# Patient Record
Sex: FEMALE | Race: BLACK OR AFRICAN AMERICAN | Employment: FULL TIME | URBAN - METROPOLITAN AREA
[De-identification: names, ages, dates, MRNs, and addresses within clinical notes are randomized per-mention and may not be internally consistent; named-entity substitution may affect disease eponyms.]

---

## 2021-10-13 PROBLEM — N81.2 CERVIX PROLAPSED INTO VAGINA: Status: ACTIVE | Noted: 2021-10-13

## 2021-10-13 PROBLEM — Z01.419 ENCOUNTER FOR ANNUAL ROUTINE GYNECOLOGICAL EXAMINATION: Status: ACTIVE | Noted: 2021-10-13

## 2022-01-14 ENCOUNTER — HOSPITAL ENCOUNTER (OUTPATIENT)
Dept: GENERAL RADIOLOGY | Age: 32
Discharge: HOME | End: 2022-01-14
Attending: NURSE PRACTITIONER
Payer: COMMERCIAL

## 2022-01-14 PROBLEM — R03.0 ELEVATED BLOOD PRESSURE READING WITHOUT DIAGNOSIS OF HYPERTENSION: Status: ACTIVE | Noted: 2022-01-14

## 2022-01-14 PROBLEM — K21.9 GASTROESOPHAGEAL REFLUX DISEASE WITHOUT ESOPHAGITIS: Status: ACTIVE | Noted: 2022-01-14

## 2022-01-14 PROCEDURE — 73610 X-RAY EXAM OF ANKLE: CPT

## 2022-02-18 PROBLEM — R73.03 PREDIABETES: Status: ACTIVE | Noted: 2022-02-18

## 2022-02-18 PROBLEM — D57.3 SICKLE CELL TRAIT (HCC): Status: ACTIVE | Noted: 2022-02-18

## 2022-02-18 PROBLEM — E66.01 CLASS 3 SEVERE OBESITY DUE TO EXCESS CALORIES WITH SERIOUS COMORBIDITY AND BODY MASS INDEX (BMI) OF 50.0 TO 59.9 IN ADULT (HCC): Status: ACTIVE | Noted: 2022-02-18

## 2022-03-18 PROBLEM — K21.9 GASTROESOPHAGEAL REFLUX DISEASE WITHOUT ESOPHAGITIS: Status: ACTIVE | Noted: 2022-01-14

## 2022-03-18 PROBLEM — E66.01 CLASS 3 SEVERE OBESITY DUE TO EXCESS CALORIES WITH SERIOUS COMORBIDITY AND BODY MASS INDEX (BMI) OF 50.0 TO 59.9 IN ADULT (HCC): Status: ACTIVE | Noted: 2022-02-18

## 2022-03-18 PROBLEM — R03.0 ELEVATED BLOOD PRESSURE READING WITHOUT DIAGNOSIS OF HYPERTENSION: Status: ACTIVE | Noted: 2022-01-14

## 2022-03-18 PROBLEM — E66.813 CLASS 3 SEVERE OBESITY DUE TO EXCESS CALORIES WITH SERIOUS COMORBIDITY AND BODY MASS INDEX (BMI) OF 50.0 TO 59.9 IN ADULT: Status: ACTIVE | Noted: 2022-02-18

## 2022-03-19 PROBLEM — D57.3 SICKLE CELL TRAIT (HCC): Status: ACTIVE | Noted: 2022-02-18

## 2022-03-19 PROBLEM — Z01.419 ENCOUNTER FOR ANNUAL ROUTINE GYNECOLOGICAL EXAMINATION: Status: ACTIVE | Noted: 2021-10-13

## 2022-03-19 PROBLEM — R73.03 PREDIABETES: Status: ACTIVE | Noted: 2022-02-18

## 2022-03-20 PROBLEM — N81.2 CERVIX PROLAPSED INTO VAGINA: Status: ACTIVE | Noted: 2021-10-13

## 2022-08-03 ENCOUNTER — OFFICE VISIT (OUTPATIENT)
Dept: INTERNAL MEDICINE CLINIC | Facility: CLINIC | Age: 32
End: 2022-08-03
Payer: COMMERCIAL

## 2022-08-03 ENCOUNTER — HOSPITAL ENCOUNTER (OUTPATIENT)
Dept: GENERAL RADIOLOGY | Age: 32
Discharge: HOME | End: 2022-08-06
Payer: COMMERCIAL

## 2022-08-03 VITALS
HEART RATE: 97 BPM | TEMPERATURE: 98 F | DIASTOLIC BLOOD PRESSURE: 116 MMHG | SYSTOLIC BLOOD PRESSURE: 152 MMHG | BODY MASS INDEX: 50.02 KG/M2 | HEIGHT: 64 IN | RESPIRATION RATE: 16 BRPM | WEIGHT: 293 LBS | OXYGEN SATURATION: 97 %

## 2022-08-03 DIAGNOSIS — I10 PRIMARY HYPERTENSION: ICD-10-CM

## 2022-08-03 DIAGNOSIS — M54.41 ACUTE MIDLINE LOW BACK PAIN WITH RIGHT-SIDED SCIATICA: ICD-10-CM

## 2022-08-03 DIAGNOSIS — M54.41 ACUTE MIDLINE LOW BACK PAIN WITH RIGHT-SIDED SCIATICA: Primary | ICD-10-CM

## 2022-08-03 PROCEDURE — 99213 OFFICE O/P EST LOW 20 MIN: CPT | Performed by: INTERNAL MEDICINE

## 2022-08-03 PROCEDURE — 72110 X-RAY EXAM L-2 SPINE 4/>VWS: CPT

## 2022-08-03 RX ORDER — AMLODIPINE BESYLATE 5 MG/1
5 TABLET ORAL DAILY
Qty: 30 TABLET | Refills: 5 | Status: SHIPPED | OUTPATIENT
Start: 2022-08-03

## 2022-08-03 RX ORDER — IBUPROFEN 800 MG/1
800 TABLET ORAL 2 TIMES DAILY PRN
Qty: 30 TABLET | Refills: 1 | Status: SHIPPED | OUTPATIENT
Start: 2022-08-03

## 2022-08-03 ASSESSMENT — ENCOUNTER SYMPTOMS
SHORTNESS OF BREATH: 0
ABDOMINAL PAIN: 0
BACK PAIN: 1
SINUS PRESSURE: 0
RHINORRHEA: 0
CHEST TIGHTNESS: 0
COUGH: 0
VOMITING: 0
NAUSEA: 0
BLOOD IN STOOL: 0
DIARRHEA: 0

## 2022-08-03 NOTE — PROGRESS NOTES
[facility]      8/3/2022    Patient Name: Brianna Trevino  :  1990    Subjective:    Chief Complaint:  Chief Complaint   Patient presents with    Back Pain     Pt c/o sciatica, also pinching, numbness and burning in right leg. Foot Pain     Pt c/o right foot feeling like it's trying to buckle under. HPI c/o 2 week history of back pain radiating down her R leg, with \"pinching\" sensation, numbness; she denies history of falls, trauma; denies heavy lifting; she has numbness, tingling into her foot; she denies loss of bowel or bladder control; she is taking Ibuprofen otc, with minimal relief;     Past Medical History:   Diagnosis Date    GERD (gastroesophageal reflux disease)        Past Surgical History:   Procedure Laterality Date     SECTION  10/2020       Family History   Problem Relation Age of Onset    Arthritis Mother     Other Father         Vertigo    Uterine Cancer Neg Hx     Ovarian Cancer Neg Hx     Colon Cancer Neg Hx     Breast Cancer Neg Hx        Social History     Tobacco Use    Smoking status: Never    Smokeless tobacco: Former   Substance Use Topics    Alcohol use: Not Currently    Drug use: Never                 Current Outpatient Medications:     amLODIPine (NORVASC) 5 MG tablet, Take 1 tablet by mouth in the morning., Disp: 30 tablet, Rfl: 5    diclofenac (VOLTAREN) 50 MG EC tablet, Take 1 tablet by mouth in the morning and 1 tablet before bedtime. , Disp: 60 tablet, Rfl: 0    levonorgestrel-ethinyl estradiol (AVIANE;ALESSE;LESSINA) 0.1-20 MG-MCG per tablet, Take 1 tablet by mouth daily, Disp: , Rfl:     pantoprazole (PROTONIX) 40 MG tablet, Take 40 mg by mouth daily, Disp: , Rfl:     No Known Allergies    Review of Systems   Constitutional:  Negative for chills, fatigue and fever. HENT:  Negative for congestion, postnasal drip, rhinorrhea and sinus pressure. Eyes:  Negative for visual disturbance.    Respiratory:  Negative for cough, chest tightness and shortness of breath. Cardiovascular:  Negative for chest pain and palpitations. Gastrointestinal:  Negative for abdominal pain, blood in stool, diarrhea, nausea and vomiting. Genitourinary:  Negative for dysuria, frequency and urgency. Musculoskeletal:  Positive for back pain and myalgias. Negative for arthralgias. Skin: Negative. Neurological:  Positive for numbness. Negative for headaches. Psychiatric/Behavioral:  Negative for dysphoric mood and sleep disturbance. The patient is not nervous/anxious. Objective:  BP (!) 152/116   Pulse 97   Temp 98 °F (36.7 °C) (Temporal)   Resp 16   Ht 5' 3.5\" (1.613 m)   Wt 293 lb 6.4 oz (133.1 kg)   SpO2 97%   BMI 51.16 kg/m²     Examination:  Physical Exam  Constitutional:       Appearance: Normal appearance. She is obese. HENT:      Head: Normocephalic and atraumatic. Right Ear: Tympanic membrane and ear canal normal.      Left Ear: Tympanic membrane and ear canal normal.      Nose: Nose normal.      Mouth/Throat:      Mouth: Mucous membranes are moist.   Eyes:      Extraocular Movements: Extraocular movements intact. Conjunctiva/sclera: Conjunctivae normal.      Pupils: Pupils are equal, round, and reactive to light. Cardiovascular:      Rate and Rhythm: Normal rate and regular rhythm. Pulses: Normal pulses. Heart sounds: Normal heart sounds. Pulmonary:      Effort: Pulmonary effort is normal.      Breath sounds: Normal breath sounds. Abdominal:      General: Abdomen is flat. Bowel sounds are normal.      Palpations: Abdomen is soft. Musculoskeletal:         General: Tenderness present. Cervical back: Normal range of motion and neck supple. Skin:     General: Skin is warm and dry. Neurological:      General: No focal deficit present. Mental Status: She is alert. Mental status is at baseline. Psychiatric:         Mood and Affect: Mood normal.         Thought Content:  Thought content normal. Assessment/Plan:    Penelope Roach was seen today for back pain and foot pain. Diagnoses and all orders for this visit:    Acute midline low back pain with right-sided sciatica  Instructed to take medications as prescribed, and to call if no improvement in symptoms. Recommended heating pad, stretching exercises; she is to call if worsening symptoms;     -     XR LUMBAR SPINE (MIN 4 VIEWS); Future  -     diclofenac (VOLTAREN) 50 MG EC tablet; Take 1 tablet by mouth in the morning and 1 tablet before bedtime. Primary hypertension  Recommended low sodium diet; Goal: take meds as prescribed, monitor blood pressure at home and call with readings. -     amLODIPine (NORVASC) 5 MG tablet; Take 1 tablet by mouth in the morning. Follow-up and Dispositions    Return in about 4 weeks (around 8/31/2022), or if symptoms worsen or fail to improve. Medication Reconciliation:  Current Medications Verified: Current medications/ immunizations were reviewed, including purpose, with patient. Family History, Social History, Current and Past Medical History was reviewed with patient and updated at today's office visit. Medication Reconciliation list was given to patient/ family. Patient was advised to discard old medication lists and provide all providers with current list at each visit and carry list with them in case of emergency.     Completed By:   Tomas Ramos MD    [facility]  [address] [place] [address], [address] [place] [address], [address]  [phone]  [phone] fax  4:15 PM

## 2022-08-30 ENCOUNTER — OFFICE VISIT (OUTPATIENT)
Dept: OBGYN CLINIC | Age: 32
End: 2022-08-30
Payer: COMMERCIAL

## 2022-08-30 VITALS
WEIGHT: 293 LBS | BODY MASS INDEX: 50.02 KG/M2 | SYSTOLIC BLOOD PRESSURE: 158 MMHG | HEIGHT: 64 IN | DIASTOLIC BLOOD PRESSURE: 98 MMHG

## 2022-08-30 DIAGNOSIS — N92.6 IRREGULAR MENSES: Primary | ICD-10-CM

## 2022-08-30 DIAGNOSIS — E66.01 CLASS 3 SEVERE OBESITY WITH BODY MASS INDEX (BMI) OF 50.0 TO 59.9 IN ADULT, UNSPECIFIED OBESITY TYPE, UNSPECIFIED WHETHER SERIOUS COMORBIDITY PRESENT (HCC): ICD-10-CM

## 2022-08-30 DIAGNOSIS — I10 PRIMARY HYPERTENSION: ICD-10-CM

## 2022-08-30 DIAGNOSIS — E28.2 PCOS (POLYCYSTIC OVARIAN SYNDROME): ICD-10-CM

## 2022-08-30 DIAGNOSIS — N91.2 AMENORRHEA: ICD-10-CM

## 2022-08-30 PROCEDURE — 99214 OFFICE O/P EST MOD 30 MIN: CPT | Performed by: NURSE PRACTITIONER

## 2022-08-30 RX ORDER — MEDROXYPROGESTERONE ACETATE 10 MG/1
10 TABLET ORAL DAILY
Qty: 14 TABLET | Refills: 0 | Status: SHIPPED | OUTPATIENT
Start: 2022-08-30 | End: 2022-10-04 | Stop reason: ALTCHOICE

## 2022-08-30 ASSESSMENT — PATIENT HEALTH QUESTIONNAIRE - PHQ9
SUM OF ALL RESPONSES TO PHQ QUESTIONS 1-9: 0
SUM OF ALL RESPONSES TO PHQ QUESTIONS 1-9: 0
2. FEELING DOWN, DEPRESSED OR HOPELESS: 0
1. LITTLE INTEREST OR PLEASURE IN DOING THINGS: 0
SUM OF ALL RESPONSES TO PHQ QUESTIONS 1-9: 0
SUM OF ALL RESPONSES TO PHQ9 QUESTIONS 1 & 2: 0
SUM OF ALL RESPONSES TO PHQ QUESTIONS 1-9: 0

## 2022-08-30 NOTE — PATIENT INSTRUCTIONS
Thank you for coming today  Return on Thursday for blood work  Then start Provera 10 mg daily for 14 days  Return in 3 weeks for your ultrasound

## 2022-08-30 NOTE — ASSESSMENT & PLAN NOTE
UPT negative  Previous dx of PCOS in her 25s (irrgeular menses and acanthosis). Recent hx of pre-diabetes  Will RTO for fasting TSH, PRL, PCOS, and POF labs  Then start Provera 10 mg x14 days  F/u after provera for US and to discuss treatment options. Recommend avoiding estrogen containing medications given uncontrolled HTN  Long discussion regarding compliance with HTN treatment and weight loss. Pt states she is motivated to start eating healthier.

## 2022-08-30 NOTE — PROGRESS NOTES
Patric Lee is a 34 y.o. Meridith Galeazzi who is here to discuss missed period. Pt previously on OCPs and stopped OCPs in 2022. Pt reports she had menses Q month until  and no menses since then. Previous dx of PCOS in her 25s (based of irregular menses and darkening of skin behind neck). Patient's last menstrual period was 2022 (approximate). Past Medical History:   Diagnosis Date    GERD (gastroesophageal reflux disease)     Hypertension        Past Surgical History:   Procedure Laterality Date     SECTION  10/2020       Family History   Problem Relation Age of Onset    Arthritis Mother     Other Father         Vertigo    Uterine Cancer Neg Hx     Ovarian Cancer Neg Hx     Colon Cancer Neg Hx     Breast Cancer Neg Hx        Social History     Socioeconomic History    Marital status: Single     Spouse name: Not on file    Number of children: Not on file    Years of education: Not on file    Highest education level: Not on file   Occupational History    Not on file   Tobacco Use    Smoking status: Never    Smokeless tobacco: Former   Substance and Sexual Activity    Alcohol use: Not Currently    Drug use: Never    Sexual activity: Not Currently     Birth control/protection: None   Other Topics Concern    Not on file   Social History Narrative    Abuse: Feels safe at home, no history of physical abuse, no history of sexual abuse       Social Determinants of Health     Financial Resource Strain: Not on file   Food Insecurity: Not on file   Transportation Needs: Not on file   Physical Activity: Not on file   Stress: Not on file   Social Connections: Not on file   Intimate Partner Violence: Not on file   Housing Stability: Not on file           Objective:    Vitals:    22 1536   BP: (!) 158/98   Site: Left Upper Arm   Position: Sitting   Weight: 293 lb (132.9 kg)   Height: 5' 3.5\" (1.613 m)         Constitutional:  well-developed, well-nourished, and in no distress. Mental: Alert and awake. Oriented to person/place/time. Able to follow commands    Eyes: EOM nl, Sclera nl, Ocular Discharge not visualized    HENT: Normocephalic and atraumatic. Mouth/Throat: Mucous membranes are moist    External Ears: nl    Neck: Normal range of motion. No masses visualized       Pulmonary: Effort normal. No visualized signs of difficulty breathing or respiratory distress    Musculoskeletal: Normal range of motion. Normal gait with no signs of ataxia     Neurological: No Facial Asymmetry (Cranial nerve 7 motor function) No gaze palsy    Skin: No significant exanthematous lesions or discoloration noted on facial skin      Psych: Normal affect. No hallucinations              Assessment/Plan            Patient Active Problem List    Diagnosis Date Noted    Amenorrhea 08/30/2022     Priority: Medium     Assessment & Plan Note:     UPT negative  Previous dx of PCOS in her 25s (irrgeular menses and acanthosis). Recent hx of pre-diabetes  Will RTO for fasting TSH, PRL, PCOS, and POF labs  Then start Provera 10 mg x14 days  F/u after provera for US and to discuss treatment options. Recommend avoiding estrogen containing medications given uncontrolled HTN      Primary hypertension 08/03/2022     Priority: Medium     Assessment & Plan Note:     Pt not taking amlodipine as prescribed - compliance encouraged.  We discuss at length risk of untreated HTN including stroke, kidney damage, and CAD      Class 3 severe obesity due to excess calories with serious comorbidity and body mass index (BMI) of 50.0 to 59.9 in adult ([facility] [address] 02/18/2022    Sickle cell trait ([facility] [address] 02/18/2022    Prediabetes 02/18/2022    Elevated blood pressure reading without diagnosis of hypertension 01/14/2022    Gastroesophageal reflux disease without esophagitis 01/14/2022    Encounter for annual routine gynecological examination 10/13/2021    Cervix prolapsed into vagina 10/13/2021       Problem List Items Addressed This Visit

## 2022-08-30 NOTE — PROGRESS NOTES
Patient comes in today for irregular cycle. Patient states she stopped her birth control in March. Patient states she wanted to get her hormones checked for PCOS. She stated she was dx with PCOS in her 19's. LAST PAP:  10/14/2021, Negative    LAST MAMMO:  Never    LMP:  Patient's last menstrual period was 06/13/2022 (approximate).     BIRTH CONTROL:  none    TOBACCO USE:  No    FAMILY HISTORY OF:   Breast Cancer:  No   Ovarian Cancer:  No   Uterine Cancer:  No   Colon Cancer:  No    Vitals:    08/30/22 1536   BP: (!) 158/98   Site: Left Upper Arm   Position: Sitting   Weight: 293 lb (132.9 kg)   Height: 5' 3.5\" (1.613 m)        Lisa Espinoza MA  08/30/22  3:44 PM

## 2022-09-01 DIAGNOSIS — N91.2 AMENORRHEA: ICD-10-CM

## 2022-09-01 DIAGNOSIS — E66.01 CLASS 3 SEVERE OBESITY WITH BODY MASS INDEX (BMI) OF 50.0 TO 59.9 IN ADULT, UNSPECIFIED OBESITY TYPE, UNSPECIFIED WHETHER SERIOUS COMORBIDITY PRESENT (HCC): ICD-10-CM

## 2022-09-01 DIAGNOSIS — E28.2 PCOS (POLYCYSTIC OVARIAN SYNDROME): ICD-10-CM

## 2022-09-01 DIAGNOSIS — N92.6 IRREGULAR MENSES: ICD-10-CM

## 2022-09-01 LAB
ALBUMIN SERPL-MCNC: 3.6 G/DL (ref 3.5–5)
ALBUMIN/GLOB SERPL: 0.9 {RATIO} (ref 1.2–3.5)
ALP SERPL-CCNC: 88 U/L (ref 50–136)
ALT SERPL-CCNC: 26 U/L (ref 12–65)
ANION GAP SERPL CALC-SCNC: 6 MMOL/L (ref 4–13)
AST SERPL-CCNC: 13 U/L (ref 15–37)
BILIRUB SERPL-MCNC: 0.3 MG/DL (ref 0.2–1.1)
BUN SERPL-MCNC: 9 MG/DL (ref 6–23)
CALCIUM SERPL-MCNC: 9.3 MG/DL (ref 8.3–10.4)
CHLORIDE SERPL-SCNC: 106 MMOL/L (ref 101–110)
CHOLEST SERPL-MCNC: 191 MG/DL
CO2 SERPL-SCNC: 26 MMOL/L (ref 21–32)
CREAT SERPL-MCNC: 0.8 MG/DL (ref 0.6–1)
ERYTHROCYTE [DISTWIDTH] IN BLOOD BY AUTOMATED COUNT: 15.8 % (ref 11.9–14.6)
GLOBULIN SER CALC-MCNC: 4 G/DL (ref 2.3–3.5)
GLUCOSE SERPL-MCNC: 84 MG/DL (ref 65–100)
HCT VFR BLD AUTO: 39.2 % (ref 35.8–46.3)
HDLC SERPL-MCNC: 46 MG/DL (ref 40–60)
HDLC SERPL: 4.2 {RATIO}
HGB BLD-MCNC: 11.9 G/DL (ref 11.7–15.4)
LDLC SERPL CALC-MCNC: 118.2 MG/DL
MCH RBC QN AUTO: 22.5 PG (ref 26.1–32.9)
MCHC RBC AUTO-ENTMCNC: 30.4 G/DL (ref 31.4–35)
MCV RBC AUTO: 74 FL (ref 79.6–97.8)
NRBC # BLD: 0 K/UL (ref 0–0.2)
PLATELET # BLD AUTO: 381 K/UL (ref 150–450)
PMV BLD AUTO: 11.3 FL (ref 9.4–12.3)
POTASSIUM SERPL-SCNC: 4.1 MMOL/L (ref 3.5–5.1)
PROGEST SERPL-MCNC: <0.21 NG/ML
PROT SERPL-MCNC: 7.6 G/DL (ref 6.3–8.2)
RBC # BLD AUTO: 5.3 M/UL (ref 4.05–5.2)
SODIUM SERPL-SCNC: 138 MMOL/L (ref 136–145)
TRIGL SERPL-MCNC: 134 MG/DL (ref 35–150)
TSH W FREE THYROID IF ABNORMAL: 1.48 UIU/ML (ref 0.36–3.74)
VLDLC SERPL CALC-MCNC: 26.8 MG/DL (ref 6–23)
WBC # BLD AUTO: 6 K/UL (ref 4.3–11.1)

## 2022-09-02 LAB
DHEA-S SERPL-MCNC: 49.1 UG/DL (ref 84.8–378)
EST. AVERAGE GLUCOSE BLD GHB EST-MCNC: 120 MG/DL
ESTRADIOL SERPL-MCNC: 113 PG/ML
FSH SERPL-ACNC: 7.4 MIU/ML
HBA1C MFR BLD: 5.8 % (ref 4.8–5.6)
INSULIN SERPL-ACNC: 40.4 UIU/ML (ref 2.6–24.9)
LH SERPL-ACNC: 7.8 MIU/ML
PROLACTIN SERPL-MCNC: 9.2 NG/ML

## 2022-09-04 LAB
TESTOST FREE SERPL-MCNC: 0.6 PG/ML (ref 0–4.2)
TESTOST SERPL-MCNC: <3 NG/DL (ref 8–60)

## 2022-09-11 LAB — MIS SERPL-MCNC: 0.41 NG/ML

## 2022-10-04 ENCOUNTER — OFFICE VISIT (OUTPATIENT)
Dept: OBGYN CLINIC | Age: 32
End: 2022-10-04
Payer: COMMERCIAL

## 2022-10-04 VITALS
BODY MASS INDEX: 49 KG/M2 | HEIGHT: 64 IN | DIASTOLIC BLOOD PRESSURE: 84 MMHG | SYSTOLIC BLOOD PRESSURE: 128 MMHG | WEIGHT: 287 LBS

## 2022-10-04 DIAGNOSIS — N91.2 AMENORRHEA: Primary | ICD-10-CM

## 2022-10-04 DIAGNOSIS — E28.2 PCOS (POLYCYSTIC OVARIAN SYNDROME): ICD-10-CM

## 2022-10-04 DIAGNOSIS — N92.6 IRREGULAR MENSES: ICD-10-CM

## 2022-10-04 DIAGNOSIS — N83.201 CYST OF RIGHT OVARY: ICD-10-CM

## 2022-10-04 PROCEDURE — 76830 TRANSVAGINAL US NON-OB: CPT | Performed by: NURSE PRACTITIONER

## 2022-10-04 PROCEDURE — 99214 OFFICE O/P EST MOD 30 MIN: CPT | Performed by: NURSE PRACTITIONER

## 2022-10-04 RX ORDER — LEVONORGESTREL AND ETHINYL ESTRADIOL 0.1-0.02MG
1 KIT ORAL DAILY
Qty: 3 PACKET | Refills: 4 | Status: SHIPPED | OUTPATIENT
Start: 2022-10-04 | End: 2022-10-04

## 2022-10-04 RX ORDER — DROSPIRENONE 4 MG/1
1 TABLET, FILM COATED ORAL DAILY
Qty: 84 TABLET | Refills: 3 | Status: SHIPPED | OUTPATIENT
Start: 2022-10-04 | End: 2022-10-10 | Stop reason: SDUPTHER

## 2022-10-04 ASSESSMENT — PATIENT HEALTH QUESTIONNAIRE - PHQ9
1. LITTLE INTEREST OR PLEASURE IN DOING THINGS: 0
2. FEELING DOWN, DEPRESSED OR HOPELESS: 0
SUM OF ALL RESPONSES TO PHQ QUESTIONS 1-9: 0
SUM OF ALL RESPONSES TO PHQ9 QUESTIONS 1 & 2: 0
SUM OF ALL RESPONSES TO PHQ QUESTIONS 1-9: 0

## 2022-10-04 NOTE — PROGRESS NOTES
Pt is here for recheck and US. No other complaints. LAST PAP:  10/14/2021 Negative    LAST MAMMO:  Never    LMP:  Patient's last menstrual period was 10/02/2022 (approximate).     BIRTH CONTROL:  none    TOBACCO USE:  No    FAMILY HISTORY OF:   Breast Cancer:  No   Ovarian Cancer:  No   Uterine Cancer:  No   Colon Cancer:  No    Vitals:    10/04/22 1521   BP: 128/84   Site: Left Upper Arm   Position: Sitting   Weight: 287 lb (130.2 kg)   Height: 5' 3.5\" (1.613 m)        Lorie Lehman MA  10/04/22  3:29 PM

## 2022-10-04 NOTE — PROGRESS NOTES
Freida Garrido is a 34 y.o. Nadeem Alken who is here today for pelvic US and followup    Pt seen 22 with the following concerns    Vasu Castorena is here to discuss missed period. Pt previously on OCPs and stopped OCPs in 2022. Pt reports she had menses Q month until  and no menses since then. Previous dx of PCOS in her 25s (based of irregular menses and darkening of skin behind neck). \"    Today, pt reports she complete 14 day of provera and had withdraw bleed which lasted a little over a week      Patient's last menstrual period was 10/02/2022 (approximate).           Past Medical History:   Diagnosis Date    GERD (gastroesophageal reflux disease)     Hypertension        Past Surgical History:   Procedure Laterality Date     SECTION  10/2020       Family History   Problem Relation Age of Onset    Arthritis Mother     Other Father         Vertigo    Uterine Cancer Neg Hx     Ovarian Cancer Neg Hx     Colon Cancer Neg Hx     Breast Cancer Neg Hx        Social History     Socioeconomic History    Marital status: Single     Spouse name: Not on file    Number of children: Not on file    Years of education: Not on file    Highest education level: Not on file   Occupational History    Not on file   Tobacco Use    Smoking status: Never    Smokeless tobacco: Former   Substance and Sexual Activity    Alcohol use: Not Currently    Drug use: Never    Sexual activity: Not Currently     Birth control/protection: None   Other Topics Concern    Not on file   Social History Narrative    Abuse: Feels safe at home, no history of physical abuse, no history of sexual abuse       Social Determinants of Health     Financial Resource Strain: Not on file   Food Insecurity: Not on file   Transportation Needs: Not on file   Physical Activity: Not on file   Stress: Not on file   Social Connections: Not on file   Intimate Partner Violence: Not on file   Housing Stability: Not on file           Objective:    Vitals:    10/04/22 1521   BP: 128/84   Site: Left Upper Arm   Position: Sitting   Weight: 287 lb (130.2 kg)   Height: 5' 3.5\" (1.613 m)         Constitutional:  well-developed, well-nourished, and in no distress. Mental: Alert and awake. Oriented to person/place/time. Able to follow commands    Eyes: EOM nl, Sclera nl, Ocular Discharge not visualized    HENT: Normocephalic and atraumatic. Mouth/Throat: Mucous membranes are moist    External Ears: nl    Neck: Normal range of motion. No masses visualized       Pulmonary: Effort normal. No visualized signs of difficulty breathing or respiratory distress    Musculoskeletal: Normal range of motion. Normal gait with no signs of ataxia     Neurological: No Facial Asymmetry (Cranial nerve 7 motor function) No gaze palsy    Skin: No significant exanthematous lesions or discoloration noted on facial skin      Psych: Normal affect. No hallucinations              Assessment/Plan            Patient Active Problem List    Diagnosis Date Noted    PCOS (polycystic ovarian syndrome) 08/30/2022     Priority: Medium     Assessment & Plan Note:     08/30/22: UPT negative  Previous dx of PCOS in her 25s (irrgeular menses and acanthosis). Recent hx of pre-diabetes  Will RTO for fasting TSH, PRL, PCOS, and POF labs  Then start Provera 10 mg x14 days  F/u after provera for US and to discuss treatment options. Recommend avoiding estrogen containing medications given uncontrolled HTN    10/4/22: pelvic US today nl  Given hx irregular menses and insulin resistance pt again meets PCOS criteria  Had withdraw bleed with provera. Will start progesterone only OCP (previously on combined OCP but given HTN recommend prog. Only). Pt declines metformin due to previous S/E.  Plan to start myoinositol and f/u 3 months      Primary hypertension 08/03/2022     Priority: Medium    Class 3 severe obesity due to excess calories with serious comorbidity and body mass index (BMI) of 50.0 to 59.9 in adult [facility]) 02/18/2022 Sickle cell trait ([facility] [address] 02/18/2022    Prediabetes 02/18/2022    Elevated blood pressure reading without diagnosis of hypertension 01/14/2022    Gastroesophageal reflux disease without esophagitis 01/14/2022    Encounter for annual routine gynecological examination 10/13/2021    Cervix prolapsed into vagina 10/13/2021       Problem List Items Addressed This Visit          Endocrine    PCOS (polycystic ovarian syndrome) - Primary     08/30/22: UPT negative  Previous dx of PCOS in her 25s (irrgeular menses and acanthosis). Recent hx of pre-diabetes  Will RTO for fasting TSH, PRL, PCOS, and POF labs  Then start Provera 10 mg x14 days  F/u after provera for US and to discuss treatment options. Recommend avoiding estrogen containing medications given uncontrolled HTN    10/4/22: pelvic US today nl  Given hx irregular menses and insulin resistance pt again meets PCOS criteria  Had withdraw bleed with provera. Will start progesterone only OCP (previously on combined OCP but given HTN recommend prog. Only). Pt declines metformin due to previous S/E. Plan to start myoinositol and f/u 3 months         Relevant Orders    AMB POC US, TRANSVAGINAL (Completed)     Other Visit Diagnoses       Irregular menses        Relevant Orders    AMB POC US, TRANSVAGINAL (Completed)    Cyst of right ovary        Relevant Orders    AMB POC US, TRANSVAGINAL (Completed)            Orders Placed This Encounter   Procedures    AMB POC US, TRANSVAGINAL       Outpatient Encounter Medications as of 10/4/2022   Medication Sig Dispense Refill    Drospirenone (SLYND) 4 MG TABS Take 1 tablet by mouth daily 84 tablet 3    amLODIPine (NORVASC) 5 MG tablet Take 1 tablet by mouth in the morning.  30 tablet 5    ibuprofen (ADVIL;MOTRIN) 800 MG tablet Take 1 tablet by mouth 2 times daily as needed for Pain 30 tablet 1    pantoprazole (PROTONIX) 40 MG tablet Take 40 mg by mouth daily      [DISCONTINUED] levonorgestrel-ethinyl estradiol (AVIANE;ALESSE;LESSINA) 0.1-20 MG-MCG per tablet Take 1 tablet by mouth daily 3 packet 4    [DISCONTINUED] medroxyPROGESTERone (PROVERA) 10 MG tablet Take 1 tablet by mouth daily (Patient not taking: Reported on 10/4/2022) 14 tablet 0    [DISCONTINUED] levonorgestrel-ethinyl estradiol (AVIANE;ALESSE;LESSINA) 0.1-20 MG-MCG per tablet Take 1 tablet by mouth daily (Patient not taking: No sig reported)       No facility-administered encounter medications on file as of 10/4/2022.                Carmen Garcia NP, APRN - CNP 10/04/22 4:18 PM

## 2022-10-04 NOTE — ASSESSMENT & PLAN NOTE
08/30/22: UPT negative  Previous dx of PCOS in her 25s (irrgeular menses and acanthosis). Recent hx of pre-diabetes  Will RTO for fasting TSH, PRL, PCOS, and POF labs  Then start Provera 10 mg x14 days  F/u after provera for US and to discuss treatment options. Recommend avoiding estrogen containing medications given uncontrolled HTN    10/4/22: pelvic US today nl  Given hx irregular menses and insulin resistance pt again meets PCOS criteria  Had withdraw bleed with provera. Will start progesterone only OCP (previously on combined OCP but given HTN recommend prog. Only). Pt declines metformin due to previous S/E.  Plan to start myoinositol and f/u 3 months

## 2022-10-05 NOTE — PROGRESS NOTES
I have reviewed the patient's visit today including history, exam and assessment by HARDEEP Mccollum. I agree with treatment/plan as above.     John Hearn MD  10:09 AM  10/05/22

## 2022-10-10 RX ORDER — DROSPIRENONE 4 MG/1
1 TABLET, FILM COATED ORAL DAILY
Qty: 84 TABLET | Refills: 3 | Status: SHIPPED | OUTPATIENT
Start: 2022-10-10

## 2022-10-13 ENCOUNTER — TELEPHONE (OUTPATIENT)
Dept: OBGYN CLINIC | Age: 32
End: 2022-10-13

## 2022-10-13 NOTE — TELEPHONE ENCOUNTER
Patient LM stating she needed to talk about her birth control rx. Patient states she got a 3 month supply of slynd for $50 but is not willing to pay that every 3 months. Patient is wanting a birth control that her insurance will pay for. Patient informed that she can call her insurance to see which progesterone only birth control is covered. Patient stated she will call and see. Patient voiced understanding.  cas

## 2022-10-18 ENCOUNTER — TELEPHONE (OUTPATIENT)
Dept: OBGYN CLINIC | Age: 32
End: 2022-10-18

## 2022-10-18 RX ORDER — ACETAMINOPHEN AND CODEINE PHOSPHATE 120; 12 MG/5ML; MG/5ML
1 SOLUTION ORAL DAILY
Qty: 84 TABLET | Refills: 3 | Status: SHIPPED | OUTPATIENT
Start: 2022-10-18

## 2022-10-18 NOTE — TELEPHONE ENCOUNTER
Patient JAJA stating she spoke with the insurance company. Her insurance stated they will cover melita for birth control. **See previous telephone messages and My Chart messages**    Please advise.

## 2023-01-04 ENCOUNTER — OFFICE VISIT (OUTPATIENT)
Dept: OBGYN CLINIC | Age: 33
End: 2023-01-04
Payer: COMMERCIAL

## 2023-01-04 VITALS
DIASTOLIC BLOOD PRESSURE: 88 MMHG | BODY MASS INDEX: 48.83 KG/M2 | HEIGHT: 64 IN | SYSTOLIC BLOOD PRESSURE: 132 MMHG | WEIGHT: 286 LBS

## 2023-01-04 DIAGNOSIS — E28.2 PCOS (POLYCYSTIC OVARIAN SYNDROME): ICD-10-CM

## 2023-01-04 PROCEDURE — 3075F SYST BP GE 130 - 139MM HG: CPT | Performed by: NURSE PRACTITIONER

## 2023-01-04 PROCEDURE — 3079F DIAST BP 80-89 MM HG: CPT | Performed by: NURSE PRACTITIONER

## 2023-01-04 PROCEDURE — 99212 OFFICE O/P EST SF 10 MIN: CPT | Performed by: NURSE PRACTITIONER

## 2023-01-04 NOTE — ASSESSMENT & PLAN NOTE
Pt doing well with norethindrone (did have 2 menses in December when she was sick and on antibiotics)  Will continue to monitor  If menses continue to regulate, f/u for annual other call sooner if any concerns

## 2023-01-04 NOTE — PROGRESS NOTES
Pt comes in today for 3 month follow up. Pt needs to discuss birth control. Pt reports 2 periods in December. LAST PAP:  10/14/2021 Negative     LAST MAMMO:  Never    LMP:  Patient's last menstrual period was 12/13/2022 (approximate).     BIRTH CONTROL:  OCP (progesterone)    TOBACCO USE:  No    FAMILY HISTORY OF:   Breast Cancer:  No   Ovarian Cancer:  No   Uterine Cancer:  No   Colon Cancer:  No    Vitals:    01/04/23 1514   BP: 132/88   Site: Left Upper Arm   Position: Sitting   Weight: 286 lb (129.7 kg)   Height: 5' 3.5\" (1.613 m)        Donnie Kendleton MA  01/04/23  3:22 PM

## 2023-01-04 NOTE — PROGRESS NOTES
Santi Dumont is a 34 y.o. Page Billings who is here for 3 month follow-up after starting OCPs for PCOS. Pt first started on slynd but insurance would not cover, switched to norethindrone. Was free but insruance then charged $9 and $19. Pt states going well, except last month she was sick and on antibiotics and got 2 five day menses. Not sexually active for > 1 year, no concern for pregnancy, declines UPT today      Patient's last menstrual period was 2022 (approximate).           Past Medical History:   Diagnosis Date    GERD (gastroesophageal reflux disease)     Hypertension        Past Surgical History:   Procedure Laterality Date     SECTION  10/2020       Family History   Problem Relation Age of Onset    Arthritis Mother     Other Father         Vertigo    Uterine Cancer Neg Hx     Ovarian Cancer Neg Hx     Colon Cancer Neg Hx     Breast Cancer Neg Hx        Social History     Socioeconomic History    Marital status: Single     Spouse name: Not on file    Number of children: Not on file    Years of education: Not on file    Highest education level: Not on file   Occupational History    Not on file   Tobacco Use    Smoking status: Never    Smokeless tobacco: Former   Substance and Sexual Activity    Alcohol use: Not Currently    Drug use: Never    Sexual activity: Not Currently     Birth control/protection: None   Other Topics Concern    Not on file   Social History Narrative    Abuse: Feels safe at home, no history of physical abuse, no history of sexual abuse       Social Determinants of Health     Financial Resource Strain: Not on file   Food Insecurity: Not on file   Transportation Needs: Not on file   Physical Activity: Not on file   Stress: Not on file   Social Connections: Not on file   Intimate Partner Violence: Not on file   Housing Stability: Not on file           Objective:    Vitals:    23 1514   BP: 132/88   Site: Left Upper Arm   Position: Sitting   Weight: 286 lb (129.7 kg) Height: 5' 3.5\" (1.613 m)         Constitutional:  well-developed, well-nourished, and in no distress. Mental: Alert and awake. Oriented to person/place/time. Able to follow commands    Eyes: EOM nl, Sclera nl, Ocular Discharge not visualized    HENT: Normocephalic and atraumatic. Mouth/Throat: Mucous membranes are moist    External Ears: nl    Neck: Normal range of motion. No masses visualized       Pulmonary: Effort normal. No visualized signs of difficulty breathing or respiratory distress    Musculoskeletal: Normal range of motion. Normal gait with no signs of ataxia     Neurological: No Facial Asymmetry (Cranial nerve 7 motor function) No gaze palsy    Skin: No significant exanthematous lesions or discoloration noted on facial skin      Psych: Normal affect.  No hallucinations              Assessment/Plan            Patient Active Problem List    Diagnosis Date Noted    PCOS (polycystic ovarian syndrome) 08/30/2022     Priority: Medium     Assessment & Plan Note:     Pt doing well with norethindrone (did have 2 menses in December when she was sick and on antibiotics)  Will continue to monitor  If menses continue to regulate, f/u for annual other call sooner if any concerns      Primary hypertension 08/03/2022     Priority: Medium    Class 3 severe obesity due to excess calories with serious comorbidity and body mass index (BMI) of 50.0 to 59.9 in adult ([facility] [address] 02/18/2022    Sickle cell trait ([facility] [address] 02/18/2022    Prediabetes 02/18/2022    Elevated blood pressure reading without diagnosis of hypertension 01/14/2022    Gastroesophageal reflux disease without esophagitis 01/14/2022    Encounter for annual routine gynecological examination 10/13/2021    Cervix prolapsed into vagina 10/13/2021       Problem List Items Addressed This Visit          Endocrine    PCOS (polycystic ovarian syndrome)     Pt doing well with norethindrone (did have 2 menses in December when she was sick and on antibiotics)  Will continue to monitor  If menses continue to regulate, f/u for annual other call sooner if any concerns            No orders of the defined types were placed in this encounter. Outpatient Encounter Medications as of 1/4/2023   Medication Sig Dispense Refill    norethindrone (ORTHO MICRONOR) 0.35 MG tablet Take 1 tablet by mouth daily 84 tablet 3    amLODIPine (NORVASC) 5 MG tablet Take 1 tablet by mouth in the morning. 30 tablet 5    ibuprofen (ADVIL;MOTRIN) 800 MG tablet Take 1 tablet by mouth 2 times daily as needed for Pain 30 tablet 1    pantoprazole (PROTONIX) 40 MG tablet Take 40 mg by mouth daily      [DISCONTINUED] Drospirenone (SLYND) 4 MG TABS Take 1 tablet by mouth daily (Patient not taking: Reported on 1/4/2023) 84 tablet 3     No facility-administered encounter medications on file as of 1/4/2023.                Julio César Mott NP, APRN - CNP 01/04/23 3:41 PM

## 2023-01-09 RX ORDER — PANTOPRAZOLE SODIUM 40 MG/1
TABLET, DELAYED RELEASE ORAL
Qty: 90 TABLET | Refills: 0 | OUTPATIENT
Start: 2023-01-09

## 2023-01-16 NOTE — TELEPHONE ENCOUNTER
Pt called, requests refill of pantoprazole 40 mg tablet. Requests 90 day supply.     [phone]

## 2023-01-18 RX ORDER — PANTOPRAZOLE SODIUM 40 MG/1
40 TABLET, DELAYED RELEASE ORAL DAILY
Qty: 90 TABLET | Refills: 3 | Status: SHIPPED | OUTPATIENT
Start: 2023-01-18

## 2023-03-13 ENCOUNTER — OFFICE VISIT (OUTPATIENT)
Dept: OBGYN CLINIC | Age: 33
End: 2023-03-13
Payer: COMMERCIAL

## 2023-03-13 VITALS
BODY MASS INDEX: 48.83 KG/M2 | HEIGHT: 64 IN | WEIGHT: 286 LBS | DIASTOLIC BLOOD PRESSURE: 80 MMHG | SYSTOLIC BLOOD PRESSURE: 138 MMHG

## 2023-03-13 DIAGNOSIS — N81.2 CERVIX PROLAPSED INTO VAGINA: ICD-10-CM

## 2023-03-13 DIAGNOSIS — N81.4 UTERINE PROLAPSE: Primary | ICD-10-CM

## 2023-03-13 PROCEDURE — 3075F SYST BP GE 130 - 139MM HG: CPT | Performed by: NURSE PRACTITIONER

## 2023-03-13 PROCEDURE — 99214 OFFICE O/P EST MOD 30 MIN: CPT | Performed by: NURSE PRACTITIONER

## 2023-03-13 PROCEDURE — 3079F DIAST BP 80-89 MM HG: CPT | Performed by: NURSE PRACTITIONER

## 2023-03-13 RX ORDER — FLUTICASONE PROPIONATE 50 MCG
SPRAY, SUSPENSION (ML) NASAL
COMMUNITY
Start: 2022-12-29

## 2023-03-13 SDOH — ECONOMIC STABILITY: FOOD INSECURITY: WITHIN THE PAST 12 MONTHS, YOU WORRIED THAT YOUR FOOD WOULD RUN OUT BEFORE YOU GOT MONEY TO BUY MORE.: NEVER TRUE

## 2023-03-13 SDOH — ECONOMIC STABILITY: HOUSING INSECURITY
IN THE LAST 12 MONTHS, WAS THERE A TIME WHEN YOU DID NOT HAVE A STEADY PLACE TO SLEEP OR SLEPT IN A SHELTER (INCLUDING NOW)?: NO

## 2023-03-13 SDOH — ECONOMIC STABILITY: INCOME INSECURITY: HOW HARD IS IT FOR YOU TO PAY FOR THE VERY BASICS LIKE FOOD, HOUSING, MEDICAL CARE, AND HEATING?: NOT HARD AT ALL

## 2023-03-13 SDOH — ECONOMIC STABILITY: FOOD INSECURITY: WITHIN THE PAST 12 MONTHS, THE FOOD YOU BOUGHT JUST DIDN'T LAST AND YOU DIDN'T HAVE MONEY TO GET MORE.: NEVER TRUE

## 2023-03-13 ASSESSMENT — PATIENT HEALTH QUESTIONNAIRE - PHQ9
SUM OF ALL RESPONSES TO PHQ QUESTIONS 1-9: 0
1. LITTLE INTEREST OR PLEASURE IN DOING THINGS: 0
SUM OF ALL RESPONSES TO PHQ9 QUESTIONS 1 & 2: 0
SUM OF ALL RESPONSES TO PHQ QUESTIONS 1-9: 0
2. FEELING DOWN, DEPRESSED OR HOPELESS: 0
SUM OF ALL RESPONSES TO PHQ QUESTIONS 1-9: 0
SUM OF ALL RESPONSES TO PHQ QUESTIONS 1-9: 0

## 2023-03-13 NOTE — ASSESSMENT & PLAN NOTE
10/13/2021: We discussed at length prolapse, 1/2 thru vault  Pt reports occasional discomfort  We discuss treatment options including weight loss, pelvic floor physical therapy. Pt desires future pregnancy so surgical options not an option    3/13/2023: prolapse of cervix now to introitus and now causing discomfort/pressure when on menses  We again discuss tx options including weight loss efforts. We discuss PFPT but doubtful this will be helpful at correction given   TVUS nl in 10/2022  Pt reports she does NOT wish to have anymore children and interested in surgical correction  Referral sent to urogyn.

## 2023-03-13 NOTE — PROGRESS NOTES
Jagdeep Thayer is a 28 y.o. Danielito Saulor who is here today to discuss cervical prolapse. Pt states this was first noted approx 10 years ago, before even having children. Has never really caused discomfort until recently. Now causing pelvic discomfort especially when on menses. Not currently sexually active, but denies pain with intercourse. Pt on mini pill for birth control/PCOS which has regulated menses well Q month, lasting approx 3 days. Today denies any vaginal discharge, itching or odor. Denies any urinary concerns, no incontinence. Bms 1-2x/day, soft. Pt denies needing to splint cervix into vagina in order to urinate or have BM      Patient's last menstrual period was 2023 (approximate).         Past Medical History:   Diagnosis Date    GERD (gastroesophageal reflux disease)     Hypertension        Past Surgical History:   Procedure Laterality Date     SECTION  10/2020       Family History   Problem Relation Age of Onset    Arthritis Mother     Other Father         Vertigo    Uterine Cancer Neg Hx     Ovarian Cancer Neg Hx     Colon Cancer Neg Hx     Breast Cancer Neg Hx        Social History     Socioeconomic History    Marital status: Single     Spouse name: Not on file    Number of children: Not on file    Years of education: Not on file    Highest education level: Not on file   Occupational History    Not on file   Tobacco Use    Smoking status: Former     Packs/day: 0.25     Types: Cigarettes     Start date: 2008     Quit date: 2018     Years since quittin.7    Smokeless tobacco: Never   Substance and Sexual Activity    Alcohol use: Not Currently    Drug use: Never    Sexual activity: Not Currently     Birth control/protection: Pill     Comment: POPs   Other Topics Concern    Not on file   Social History Narrative    Abuse: Feels safe at home, no history of physical abuse, no history of sexual abuse       Social Determinants of Health     Financial Resource Strain: Low Risk Difficulty of Paying Living Expenses: Not hard at all   Food Insecurity: No Food Insecurity    Worried About [facility] in the Last Year: Never true    Ran Out of Food in the Last Year: Never true   Transportation Needs: Unknown    Lack of Transportation (Medical): Not on file    Lack of Transportation (Non-Medical): No   Physical Activity: Not on file   Stress: Not on file   Social Connections: Not on file   Intimate Partner Violence: Not on file   Housing Stability: Unknown    Unable to Pay for Housing in the Last Year: Not on file    Number of Places Lived in the Last Year: Not on file    Unstable Housing in the Last Year: No           Objective:    Vitals:    03/13/23 1425   BP: 138/80   Site: Left Upper Arm   Position: Sitting   Weight: 286 lb (129.7 kg)   Height: 5' 3.5\" (1.613 m)         Constitutional:  well-developed, well-nourished, and in no distress. Mental: Alert and awake. Oriented to person/place/time. Able to follow commands    Eyes: EOM nl, Sclera nl, Ocular Discharge not visualized    HENT: Normocephalic and atraumatic. Mouth/Throat: Mucous membranes are moist    External Ears: nl    Neck: Normal range of motion. No masses visualized       Pulmonary: Effort normal. No visualized signs of difficulty breathing or respiratory distress    Pelvic Exam:       External: normal female genitalia without lesions or masses       Vagina: normal without lesions or masses, no discharge noted      Cervix: normal without lesions or masses, present at introitus initially, easily reducible to 1/2 way into vaginal vault     Uterus/Adnexa: difficult to palpate d/t body habitus        Musculoskeletal: Normal range of motion. Normal gait with no signs of ataxia     Neurological: No Facial Asymmetry (Cranial nerve 7 motor function) No gaze palsy    Skin: No significant exanthematous lesions or discoloration noted on facial skin      Psych: Normal affect.  No hallucinations              Assessment/Plan            Patient Active Problem List    Diagnosis Date Noted    PCOS (polycystic ovarian syndrome) 08/30/2022     Priority: Medium    Primary hypertension 08/03/2022     Priority: Medium    Class 3 severe obesity due to excess calories with serious comorbidity and body mass index (BMI) of 50.0 to 59.9 in adult ([facility]) 02/18/2022    Sickle cell trait ([facility]) 02/18/2022    Prediabetes 02/18/2022    Elevated blood pressure reading without diagnosis of hypertension 01/14/2022    Gastroesophageal reflux disease without esophagitis 01/14/2022    Encounter for annual routine gynecological examination 10/13/2021    Cervix prolapsed into vagina 10/13/2021     Assessment & Plan Note:     10/13/2021: We discussed at length prolapse, 1/2 thru vault  Pt reports occasional discomfort  We discuss treatment options including weight loss, pelvic floor physical therapy. Pt desires future pregnancy so surgical options not an option    3/13/2023: prolapse of cervix now to introitus and now causing discomfort/pressure when on menses  We again discuss tx options including weight loss efforts. We discuss PFPT but doubtful this will be helpful at correction given   TVUS nl in 10/2022  Pt reports she does NOT wish to have anymore children and interested in surgical correction  Referral sent to urogyn.         Problem List Items Addressed This Visit          Other    Cervix prolapsed into vagina     10/13/2021: We discussed at length prolapse, 1/2 thru vault  Pt reports occasional discomfort  We discuss treatment options including weight loss, pelvic floor physical therapy. Pt desires future pregnancy so surgical options not an option    3/13/2023: prolapse of cervix now to introitus and now causing discomfort/pressure when on menses  We again discuss tx options including weight loss efforts. We discuss PFPT but doubtful this will be helpful at correction given   TVUS nl in 10/2022  Pt reports she  does NOT wish to have anymore children and interested in surgical correction  Referral sent to urogyn. Other Visit Diagnoses       Uterine prolapse    -  Primary    Relevant Orders    [address] [place] [address] [facility]            Orders Placed This Encounter   Procedures    [address] [place] [address] [facility]       Outpatient Encounter Medications as of 3/13/2023   Medication Sig Dispense Refill    fluticasone (FLONASE) 50 MCG/ACT nasal spray       pantoprazole (PROTONIX) 40 MG tablet Take 1 tablet by mouth daily 90 tablet 3    norethindrone (ORTHO MICRONOR) 0.35 MG tablet Take 1 tablet by mouth daily 84 tablet 3    amLODIPine (NORVASC) 5 MG tablet Take 1 tablet by mouth in the morning. 30 tablet 5    ibuprofen (ADVIL;MOTRIN) 800 MG tablet Take 1 tablet by mouth 2 times daily as needed for Pain (Patient not taking: Reported on 3/13/2023) 30 tablet 1     No facility-administered encounter medications on file as of 3/13/2023.                CLAUDIA Carroll CNP 03/13/23 4:14 PM

## 2023-03-13 NOTE — PROGRESS NOTES
Patient states concerned of cervix position, possible uterine prolapse. Patient states around her menses the last several months she feels more vaginal pressure. Patient denies: itching, burning, odor, abnormal discharge, and/or pain. LAST PAP:  10/14/2021, neg., HPV testing cancelled     LAST MAMMO:  never     LMP:  Patient's last menstrual period was 03/05/2023 (approximate).     BIRTH CONTROL:  oral progesterone-only contraceptive    TOBACCO USE:  No    FAMILY HISTORY OF:   Breast Cancer:  No   Ovarian Cancer:  No   Uterine Cancer:  No   Colon Cancer:  No    Vitals:    03/13/23 1425   BP: 138/80   Site: Left Upper Arm   Position: Sitting   Weight: 286 lb (129.7 kg)   Height: 5' 3.5\" (1.613 m)        Kelly Sue RN  03/13/23  2:38 PM

## 2023-03-19 NOTE — PROGRESS NOTES
I have reviewed the patient's visit today including history, exam and assessment by HARDEEP Swanson. I agree with treatment/plan as above.     Jesusita Lee MD  4:22 PM  03/19/23

## 2023-03-23 ENCOUNTER — OFFICE VISIT (OUTPATIENT)
Dept: UROGYNECOLOGY | Age: 33
End: 2023-03-23

## 2023-03-23 VITALS — HEIGHT: 63 IN | WEIGHT: 285.8 LBS | BODY MASS INDEX: 50.64 KG/M2

## 2023-03-23 DIAGNOSIS — N88.4 CERVICAL HYPERTROPHIC ELONGATION: Primary | ICD-10-CM

## 2023-03-23 LAB
BILIRUBIN, URINE, POC: NEGATIVE
BLOOD URINE, POC: NORMAL
GLUCOSE URINE, POC: NEGATIVE
KETONES, URINE, POC: NEGATIVE
LEUKOCYTE ESTERASE, URINE, POC: NEGATIVE
NITRITE, URINE, POC: NEGATIVE
PH, URINE, POC: 6 (ref 4.6–8)
PROTEIN,URINE, POC: NEGATIVE
SPECIFIC GRAVITY, URINE, POC: 1.02 (ref 1–1.03)
URINALYSIS CLARITY, POC: CLEAR
URINALYSIS COLOR, POC: YELLOW
UROBILINOGEN, POC: NORMAL

## 2023-03-23 RX ORDER — LORATADINE 10 MG/1
10 CAPSULE, LIQUID FILLED ORAL DAILY
COMMUNITY

## 2023-03-23 RX ORDER — M-VIT,TX,IRON,MINS/CALC/FOLIC 27MG-0.4MG
1 TABLET ORAL DAILY
COMMUNITY

## 2023-03-23 NOTE — LETTER
March 23, 2023      Francine Rodgers, APRN - CNP  [address],  [address]      Patient: Cecilia Berman   MR Number: [id number]   YOB: 1990   Date of Visit: 3/23/2023       Dear Dr. Norbert Matute: Thank you for referring Isaac Harvey to me for evaluation/treatment. Below are the relevant portions of my assessment and plan of care. If you have questions, please do not hesitate to call me. I look forward to following [facility] along with you.     Sincerely,        Adarsh Fernandes, DO    CC providers:    No Recipients

## 2023-03-23 NOTE — PROGRESS NOTES
minutes reviewing previous notes, test results and face to face with the patient discussing the diagnosis and importance of compliance with the treatment plan as well as documenting on the day of the visit.     Shaye Perez DO

## 2023-03-23 NOTE — ASSESSMENT & PLAN NOTE
Cervical elongation:Her point C is significantly more positive than point D (>4 cm), the cervix is elongated. This correlates with her exam. I gave her the option of doing nothing, a hysterectomy, or a [place]/ leep procedure. We discussed that with a [place]/ Leep she runs the risk of cervical insufficiency if she were to ever get pregnant again, and it may grow back again. We discussed the risks of the hysterectomy and that this would prevent her from ever being able to carry a pregnancy again. She is going to consider her options and let me know how she would like to proceed.

## 2023-04-07 ENCOUNTER — TELEPHONE (OUTPATIENT)
Dept: OBGYN CLINIC | Age: 33
End: 2023-04-07

## 2023-04-07 NOTE — TELEPHONE ENCOUNTER
Pt lvm stating she would like to discuss her birth control with Xochitl. Called pt back, Pt stated Xochitl put her on the certain type of BC since she has high blood pressure and she wants to switch it. Informed pt that she will need to make an appt to discuss this with Xochitl.  Pt voiced understanding and appt scheduled for 4/24 @ 3:30pm.

## 2023-04-24 ENCOUNTER — OFFICE VISIT (OUTPATIENT)
Dept: OBGYN CLINIC | Age: 33
End: 2023-04-24
Payer: COMMERCIAL

## 2023-04-24 VITALS
SYSTOLIC BLOOD PRESSURE: 122 MMHG | BODY MASS INDEX: 50.85 KG/M2 | DIASTOLIC BLOOD PRESSURE: 84 MMHG | HEIGHT: 63 IN | WEIGHT: 287 LBS

## 2023-04-24 DIAGNOSIS — E28.2 PCOS (POLYCYSTIC OVARIAN SYNDROME): ICD-10-CM

## 2023-04-24 DIAGNOSIS — N92.6 IRREGULAR MENSES: Primary | ICD-10-CM

## 2023-04-24 LAB
HCG, PREGNANCY, URINE, POC: NEGATIVE
VALID INTERNAL CONTROL, POC: YES

## 2023-04-24 PROCEDURE — 99213 OFFICE O/P EST LOW 20 MIN: CPT | Performed by: NURSE PRACTITIONER

## 2023-04-24 PROCEDURE — 3074F SYST BP LT 130 MM HG: CPT | Performed by: NURSE PRACTITIONER

## 2023-04-24 PROCEDURE — 3079F DIAST BP 80-89 MM HG: CPT | Performed by: NURSE PRACTITIONER

## 2023-04-24 PROCEDURE — 81025 URINE PREGNANCY TEST: CPT | Performed by: NURSE PRACTITIONER

## 2023-04-24 RX ORDER — MEDROXYPROGESTERONE ACETATE 10 MG/1
10 TABLET ORAL DAILY
Qty: 14 TABLET | Refills: 12 | Status: SHIPPED | OUTPATIENT
Start: 2023-04-24

## 2023-04-24 ASSESSMENT — PATIENT HEALTH QUESTIONNAIRE - PHQ9
SUM OF ALL RESPONSES TO PHQ QUESTIONS 1-9: 0
2. FEELING DOWN, DEPRESSED OR HOPELESS: 0
SUM OF ALL RESPONSES TO PHQ9 QUESTIONS 1 & 2: 0
1. LITTLE INTEREST OR PLEASURE IN DOING THINGS: 0
SUM OF ALL RESPONSES TO PHQ QUESTIONS 1-9: 0

## 2023-04-24 NOTE — PROGRESS NOTES
Chidi Lorenzo is a 28 y.o. Patrick Glo who is here today to discuss birth control. Pt currently on progesterone only OCP (norethindrone). Was working well but since Dec 2022 having 2-4 menses/month. Alternates between spotting and heavy flow. Not sexually active. Declines std screening today. Pt with known PCOS and HTN. On norvasc. Pt was seen by urogyn and evaluated for cervical elongation/prolapse. Pt is still thinking about surgical tx options as she cant take time off work at this time. Patient's last menstrual period was 2023 (exact date).       Past Medical History:   Diagnosis Date    GERD (gastroesophageal reflux disease)     Hypertension        Past Surgical History:   Procedure Laterality Date     SECTION  10/2020       Family History   Problem Relation Age of Onset    Arthritis Mother     Other Father         Vertigo    Uterine Cancer Neg Hx     Ovarian Cancer Neg Hx     Colon Cancer Neg Hx     Breast Cancer Neg Hx        Social History     Socioeconomic History    Marital status: Single     Spouse name: Not on file    Number of children: Not on file    Years of education: Not on file    Highest education level: Not on file   Occupational History    Not on file   Tobacco Use    Smoking status: Former     Packs/day: 0.25     Types: Cigarettes     Start date: 2008     Quit date: 2018     Years since quittin.8    Smokeless tobacco: Never   Substance and Sexual Activity    Alcohol use: Not Currently    Drug use: Never    Sexual activity: Not Currently     Birth control/protection: Pill     Comment: POPs   Other Topics Concern    Not on file   Social History Narrative    Abuse: Feels safe at home, no history of physical abuse, no history of sexual abuse       Social Determinants of Health     Financial Resource Strain: Low Risk     Difficulty of Paying Living Expenses: Not hard at all   Food Insecurity: No Food Insecurity    Worried About Running Out of Food in the Last

## 2023-04-24 NOTE — PROGRESS NOTES
Patient here for discuss BC options. Patient states she has had frequent periods for approx 1-2 months where they are coming on about every 2 weeks. Patient has been on this [facility] for approx 6 months. Denies: burning, itching, odor, and/or pelvic pain. LAST PAP:  10/14/2021, neg., HPV cancelled. LAST MAMMO:  never     LMP:  Patient's last menstrual period was 04/20/2023 (exact date).     BIRTH CONTROL:  oral progesterone-only contraceptive    TOBACCO USE:  No    FAMILY HISTORY OF:   Breast Cancer:  No   Ovarian Cancer:  No   Uterine Cancer:  No   Colon Cancer:  No    Vitals:    04/24/23 1530   BP: 122/84   Site: Right Upper Arm   Position: Sitting   Weight: 287 lb (130.2 kg)   Height: 5' 3\" (1.6 m)        Baldomero Julio RN  04/24/23  3:39 PM

## 2023-04-24 NOTE — ASSESSMENT & PLAN NOTE
1/4/2023: Pt doing well with norethindrone (did have 2 menses in December when she was sick and on antibiotics)  Will continue to monitor  If menses continue to regulate, f/u for annual other call sooner if any concerns    4/24/23: pt reports 2-4 menses/month with prog only OCPs since Dec 2022  Pt was unable to afford slynd rx ($50/3 month supply)  Not currently sexually active, denies need for contraception  We discuss trying again to see if slynd will be approved (given failure with norethindrone) or D/C birth control and start cyclic or daily provera.    Pt would like to proceed with cyclic provera

## 2023-04-25 NOTE — PROGRESS NOTES
I have reviewed the patient's visit today including history, exam and assessment by HARDEEP Cárdenas. I agree with treatment/plan as above.     Beena Arredondo MD  7:59 AM  04/25/23

## 2023-05-07 DIAGNOSIS — I10 PRIMARY HYPERTENSION: ICD-10-CM

## 2023-05-08 RX ORDER — AMLODIPINE BESYLATE 5 MG/1
5 TABLET ORAL DAILY
Qty: 30 TABLET | Refills: 0 | OUTPATIENT
Start: 2023-05-08

## 2023-05-09 DIAGNOSIS — I10 PRIMARY HYPERTENSION: ICD-10-CM

## 2023-05-10 RX ORDER — AMLODIPINE BESYLATE 5 MG/1
5 TABLET ORAL DAILY
Qty: 30 TABLET | Refills: 5 | Status: SHIPPED | OUTPATIENT
Start: 2023-05-10

## 2023-08-16 ENCOUNTER — OFFICE VISIT (OUTPATIENT)
Dept: OBGYN CLINIC | Age: 33
End: 2023-08-16
Payer: COMMERCIAL

## 2023-08-16 VITALS
WEIGHT: 277 LBS | DIASTOLIC BLOOD PRESSURE: 82 MMHG | HEIGHT: 63 IN | SYSTOLIC BLOOD PRESSURE: 138 MMHG | BODY MASS INDEX: 49.08 KG/M2

## 2023-08-16 DIAGNOSIS — E28.2 PCOS (POLYCYSTIC OVARIAN SYNDROME): Primary | ICD-10-CM

## 2023-08-16 DIAGNOSIS — Z01.419 ENCOUNTER FOR ANNUAL ROUTINE GYNECOLOGICAL EXAMINATION: ICD-10-CM

## 2023-08-16 DIAGNOSIS — E66.01 CLASS 3 SEVERE OBESITY DUE TO EXCESS CALORIES WITH SERIOUS COMORBIDITY AND BODY MASS INDEX (BMI) OF 50.0 TO 59.9 IN ADULT (HCC): ICD-10-CM

## 2023-08-16 PROCEDURE — 3079F DIAST BP 80-89 MM HG: CPT | Performed by: NURSE PRACTITIONER

## 2023-08-16 PROCEDURE — 99395 PREV VISIT EST AGE 18-39: CPT | Performed by: NURSE PRACTITIONER

## 2023-08-16 PROCEDURE — 3075F SYST BP GE 130 - 139MM HG: CPT | Performed by: NURSE PRACTITIONER

## 2023-08-16 RX ORDER — MEDROXYPROGESTERONE ACETATE 150 MG/ML
150 INJECTION, SUSPENSION INTRAMUSCULAR
Qty: 1 ML | Refills: 3 | Status: SHIPPED | OUTPATIENT
Start: 2023-08-16

## 2023-08-16 ASSESSMENT — PATIENT HEALTH QUESTIONNAIRE - PHQ9
SUM OF ALL RESPONSES TO PHQ9 QUESTIONS 1 & 2: 1
SUM OF ALL RESPONSES TO PHQ QUESTIONS 1-9: 1
1. LITTLE INTEREST OR PLEASURE IN DOING THINGS: 1
SUM OF ALL RESPONSES TO PHQ QUESTIONS 1-9: 1
SUM OF ALL RESPONSES TO PHQ QUESTIONS 1-9: 1
2. FEELING DOWN, DEPRESSED OR HOPELESS: 0
SUM OF ALL RESPONSES TO PHQ QUESTIONS 1-9: 1

## 2023-08-16 NOTE — PROGRESS NOTES
I have reviewed the patient's visit today including history, exam and assessment by Secundino Osler, WHNP-BC. I agree with treatment/plan as above.     Gita Acharya MD  11:09 AM  08/16/23
Patient here for AE. Patient states that due to expenses/insurance she stopped taking cyclic Provera. Patient has also stopped taking BC approx 2 days ago. Patient would like to discuss starting depo. LAST PAP:  10/14/2021, neg. LAST MAMMO:  never     LMP:  Patient's last menstrual period was 08/07/2023 (approximate).     Sexually active:  not currently      BIRTH CONTROL:  none    TOBACCO USE:  No    PHQ: 1    FAMILY HISTORY OF:   Breast Cancer:  No   Ovarian Cancer:  No   Uterine Cancer:  No   Colon Cancer:  No    Vitals:    08/16/23 0947   BP: 138/82   Site: Right Upper Arm   Position: Sitting   Weight: 277 lb (125.6 kg)   Height: 5' 3\" (1.6 m)        Darnell Santoro RN  08/16/23  9:57 AM
severe obesity due to excess calories with serious comorbidity and body mass index (BMI) of 50.0 to 59.9 in adult [facility]) 02/18/2022    Sickle cell trait ([address] ) 02/18/2022    Prediabetes 02/18/2022    Elevated blood pressure reading without diagnosis of hypertension 01/14/2022    Gastroesophageal reflux disease without esophagitis 01/14/2022    Encounter for annual routine gynecological examination 10/13/2021     Assessment & Plan Note:     Pap up to date  mammo n/a  Would like to start depo for PCOS menses control, not sexually active  Declines std screening  gardasil not received, will get in am when she RTO for depo and labs        Cervix prolapsed into vagina 10/13/2021       Problem List Items Addressed This Visit          Endocrine    PCOS (polycystic ovarian syndrome) - Primary      1/4/2023: Pt doing well with norethindrone (did have 2 menses in December when she was sick and on antibiotics)  Will continue to monitor  If menses continue to regulate, f/u for annual other call sooner if any concerns     4/24/23: pt reports 2-4 menses/month with prog only OCPs since Dec 2022  Pt was unable to afford slynd rx ($50/3 month supply)  Not currently sexually active, denies need for contraception  We discuss trying again to see if slynd will be approved (given failure with norethindrone) or D/C birth control and start cyclic or daily provera.    Pt would like to proceed with cyclic provera    9/19/80: pt did not like cyclic provera, would like to try depo-provera  rx sent and RTO in am  Down 10 lbs from April, encourage continued exercise and healthy eating  Will recheck PCOS labs in am fasting           Relevant Orders    CBC    Comprehensive Metabolic Panel    Hemoglobin A1C    Insulin, Serum    Lipid Panel    TSH with Reflex       Other    Class 3 severe obesity due to excess calories with serious comorbidity and body mass index (BMI) of 50.0 to 59.9 in adult [facility])    Relevant Orders    CBC    Comprehensive Metabolic

## 2023-08-16 NOTE — ASSESSMENT & PLAN NOTE
Pap up to date  mammo n/a  Would like to start depo for PCOS menses control, not sexually active  Declines std screening  gardasil not received, will get in am when she RTO for depo and labs

## 2023-08-17 ENCOUNTER — NURSE ONLY (OUTPATIENT)
Dept: OBGYN CLINIC | Age: 33
End: 2023-08-17

## 2023-08-17 DIAGNOSIS — Z30.42 ENCOUNTER FOR MANAGEMENT AND INJECTION OF DEPO-PROVERA: Primary | ICD-10-CM

## 2023-08-17 DIAGNOSIS — E66.01 CLASS 3 SEVERE OBESITY DUE TO EXCESS CALORIES WITH SERIOUS COMORBIDITY AND BODY MASS INDEX (BMI) OF 50.0 TO 59.9 IN ADULT (HCC): ICD-10-CM

## 2023-08-17 DIAGNOSIS — E28.2 PCOS (POLYCYSTIC OVARIAN SYNDROME): ICD-10-CM

## 2023-08-17 LAB
ALBUMIN SERPL-MCNC: 3.7 G/DL (ref 3.5–5)
ALBUMIN/GLOB SERPL: 1 (ref 0.4–1.6)
ALP SERPL-CCNC: 68 U/L (ref 50–136)
ALT SERPL-CCNC: 22 U/L (ref 12–65)
ANION GAP SERPL CALC-SCNC: 6 MMOL/L (ref 2–11)
AST SERPL-CCNC: 12 U/L (ref 15–37)
BILIRUB SERPL-MCNC: 0.4 MG/DL (ref 0.2–1.1)
BUN SERPL-MCNC: 7 MG/DL (ref 6–23)
CALCIUM SERPL-MCNC: 9.6 MG/DL (ref 8.3–10.4)
CHLORIDE SERPL-SCNC: 109 MMOL/L (ref 101–110)
CHOLEST SERPL-MCNC: 174 MG/DL
CO2 SERPL-SCNC: 27 MMOL/L (ref 21–32)
CREAT SERPL-MCNC: 0.9 MG/DL (ref 0.6–1)
ERYTHROCYTE [DISTWIDTH] IN BLOOD BY AUTOMATED COUNT: 15.9 % (ref 11.9–14.6)
EST. AVERAGE GLUCOSE BLD GHB EST-MCNC: 117 MG/DL
GLOBULIN SER CALC-MCNC: 3.6 G/DL (ref 2.8–4.5)
GLUCOSE SERPL-MCNC: 83 MG/DL (ref 65–100)
HBA1C MFR BLD: 5.7 % (ref 4.8–5.6)
HCT VFR BLD AUTO: 39.5 % (ref 35.8–46.3)
HDLC SERPL-MCNC: 42 MG/DL (ref 40–60)
HDLC SERPL: 4.1
HGB BLD-MCNC: 12.2 G/DL (ref 11.7–15.4)
LDLC SERPL CALC-MCNC: 109.6 MG/DL
MCH RBC QN AUTO: 23.4 PG (ref 26.1–32.9)
MCHC RBC AUTO-ENTMCNC: 30.9 G/DL (ref 31.4–35)
MCV RBC AUTO: 75.8 FL (ref 82–102)
NRBC # BLD: 0 K/UL (ref 0–0.2)
PLATELET # BLD AUTO: 377 K/UL (ref 150–450)
PMV BLD AUTO: 10.9 FL (ref 9.4–12.3)
POTASSIUM SERPL-SCNC: 4.3 MMOL/L (ref 3.5–5.1)
PROT SERPL-MCNC: 7.3 G/DL (ref 6.3–8.2)
RBC # BLD AUTO: 5.21 M/UL (ref 4.05–5.2)
SODIUM SERPL-SCNC: 142 MMOL/L (ref 133–143)
TRIGL SERPL-MCNC: 112 MG/DL (ref 35–150)
TSH W FREE THYROID IF ABNORMAL: 1.07 UIU/ML (ref 0.36–3.74)
VLDLC SERPL CALC-MCNC: 22.4 MG/DL (ref 6–23)
WBC # BLD AUTO: 5.5 K/UL (ref 4.3–11.1)

## 2023-08-17 NOTE — PROGRESS NOTES
Patient comes in today for Depo Provera injection. Depo Provera  NDC: 5515-4170-67  Lot #: [id number]  Exp. Date: 03/2025  Site: left deltoid   Last Depo Provera Injection: n/a  Pregnancy Test: negative     Patient tolerated injection well. Pt was due to get gardasil but will check with insurance first for coverage.

## 2023-08-18 ENCOUNTER — TELEPHONE (OUTPATIENT)
Dept: OBGYN CLINIC | Age: 33
End: 2023-08-18

## 2023-08-18 LAB — INSULIN SERPL-ACNC: 25.5 UIU/ML (ref 2.6–24.9)

## 2023-08-18 NOTE — TELEPHONE ENCOUNTER
Called pt, message below reviewed with pt, pt stating she has tried Metformin and does not like it. Informed pt that we need to schedule an appt to discuss treatment options. Pt voiced understanding and appt scheduled.

## 2023-08-18 NOTE — TELEPHONE ENCOUNTER
A1C is still in pre-diabetic range, although slightly improved. LDL (bad cholesterol) is also elevated. Has she ever tried treatment for pre-diabetes with metformin? This is a medication that is used for PCOS and pre-diabetes to help regulate periods and decrease risk of developing outright diabetes. It may also help with weight loss but this is not why we prescribe it primarily. Biggest S/E can be upset stomach, N/V/D but the ER version can decrease the risk of this. If she would like to start let me know or we can make an appt to discuss further.

## 2023-08-30 ENCOUNTER — OFFICE VISIT (OUTPATIENT)
Dept: OBGYN CLINIC | Age: 33
End: 2023-08-30
Payer: COMMERCIAL

## 2023-08-30 VITALS
HEIGHT: 63 IN | BODY MASS INDEX: 48.37 KG/M2 | SYSTOLIC BLOOD PRESSURE: 154 MMHG | DIASTOLIC BLOOD PRESSURE: 90 MMHG | WEIGHT: 273 LBS

## 2023-08-30 DIAGNOSIS — E28.2 PCOS (POLYCYSTIC OVARIAN SYNDROME): ICD-10-CM

## 2023-08-30 PROCEDURE — 3080F DIAST BP >= 90 MM HG: CPT | Performed by: NURSE PRACTITIONER

## 2023-08-30 PROCEDURE — 3077F SYST BP >= 140 MM HG: CPT | Performed by: NURSE PRACTITIONER

## 2023-08-30 PROCEDURE — 99213 OFFICE O/P EST LOW 20 MIN: CPT | Performed by: NURSE PRACTITIONER

## 2023-08-30 NOTE — PROGRESS NOTES
I have reviewed the patient's visit today including history, exam and assessment by HARDEEP Croft. I agree with treatment/plan as above.     Sandra Infante MD  9:53 AM  08/30/23

## 2023-08-30 NOTE — PROGRESS NOTES
Jodie Lawson is a 28 y.o. Astrid Pavan who is here for PCOS followup and lab review to discuss pre-diabetes    Pt seen 23 with the following concerns  \"here for annual exam and PCOS followup. Pt tried cyclic provera for 1 month. States she did not want to pay $10 for medication. So she then restarted mini pill but continued to have breakthrough bleeding. She would like to switch to depo. She knows she will have to watch her weight closely with depo but its stopped her bleeding in the past.\"    23: pt doing well, started depo shot on 23 Still having some bleeding every few days      Patient's last menstrual period was 2023 (approximate).         Past Medical History:   Diagnosis Date    GERD (gastroesophageal reflux disease)     Hypertension     PCOS (polycystic ovarian syndrome) 2022    Prediabetes 2022    Sickle cell trait ([address] [facility]) 2022       Past Surgical History:   Procedure Laterality Date     SECTION  10/2020       Family History   Problem Relation Age of Onset    Arthritis Mother     Other Father         Vertigo    Uterine Cancer Neg Hx     Ovarian Cancer Neg Hx     Colon Cancer Neg Hx     Breast Cancer Neg Hx        Social History     Socioeconomic History    Marital status: Single     Spouse name: Not on file    Number of children: Not on file    Years of education: Not on file    Highest education level: Not on file   Occupational History    Not on file   Tobacco Use    Smoking status: Former     Packs/day: 0.25     Types: Cigarettes     Start date: 2008     Quit date: 2018     Years since quittin.2    Smokeless tobacco: Never   Substance and Sexual Activity    Alcohol use: Not Currently    Drug use: Never    Sexual activity: Not Currently     Birth control/protection: Pill     Comment: POPs   Other Topics Concern    Not on file   Social History Narrative    Abuse: Feels safe at home, no history of physical abuse, no history of sexual abuse

## 2023-08-30 NOTE — PROGRESS NOTES
Patient comes in today to discuss options for PCOS. LAST PAP:  10/14/2021, Negative     LAST MAMMO:  Never    LMP:  Patient's last menstrual period was 08/07/2023 (approximate).     BIRTH CONTROL:  Depo-Provera injections    TOBACCO USE:  No    FAMILY HISTORY OF:   Breast Cancer:  No   Ovarian Cancer:  No   Uterine Cancer:  No   Colon Cancer:  No    Vitals:    08/30/23 0913   Weight: 273 lb (123.8 kg)   Height: 5' 3\" (1.6 m)        Lisa Espinoza MA  08/30/23  9:17 AM

## 2023-10-26 ENCOUNTER — TELEPHONE (OUTPATIENT)
Dept: OBGYN CLINIC | Age: 33
End: 2023-10-26

## 2023-11-06 DIAGNOSIS — I10 PRIMARY HYPERTENSION: ICD-10-CM

## 2023-11-06 RX ORDER — AMLODIPINE BESYLATE 5 MG/1
5 TABLET ORAL DAILY
Qty: 30 TABLET | Refills: 0 | OUTPATIENT
Start: 2023-11-06

## 2023-11-09 ENCOUNTER — OFFICE VISIT (OUTPATIENT)
Dept: OBGYN CLINIC | Age: 33
End: 2023-11-09

## 2023-11-09 VITALS — BODY MASS INDEX: 48.36 KG/M2 | HEIGHT: 63 IN

## 2023-11-09 DIAGNOSIS — Z30.42 ENCOUNTER FOR MANAGEMENT AND INJECTION OF DEPO-PROVERA: Primary | ICD-10-CM

## 2023-11-09 NOTE — PROGRESS NOTES
Patient comes in today for Depo Provera injection. Depo Provera  NDC: 1262-8560-81  Lot #: [id number]  Exp. Date:   Site: rtIrma eid  Last Depo Provera Injection: 8/17/23  Pregnancy Test: n/a    Patient tolerated injection well.

## 2023-12-01 ENCOUNTER — TELEPHONE (OUTPATIENT)
Dept: INTERNAL MEDICINE CLINIC | Facility: CLINIC | Age: 33
End: 2023-12-01

## 2023-12-01 DIAGNOSIS — R73.03 PREDIABETES: ICD-10-CM

## 2023-12-01 DIAGNOSIS — E66.01 MORBID (SEVERE) OBESITY DUE TO EXCESS CALORIES (HCC): Primary | ICD-10-CM

## 2023-12-01 DIAGNOSIS — I10 PRIMARY HYPERTENSION: ICD-10-CM

## 2023-12-01 NOTE — TELEPHONE ENCOUNTER
Pt will need lab orders for med refill appt. Lab scheduled for 12/5/23, visit scheduled for 12/12/23.

## 2023-12-05 ENCOUNTER — HOSPITAL ENCOUNTER (OUTPATIENT)
Dept: LAB | Age: 33
Discharge: HOME | End: 2023-12-08

## 2023-12-05 DIAGNOSIS — R73.03 PREDIABETES: ICD-10-CM

## 2023-12-05 DIAGNOSIS — E66.01 MORBID (SEVERE) OBESITY DUE TO EXCESS CALORIES (HCC): ICD-10-CM

## 2023-12-05 DIAGNOSIS — I10 PRIMARY HYPERTENSION: ICD-10-CM

## 2023-12-05 LAB
ALBUMIN SERPL-MCNC: 3.5 G/DL (ref 3.5–5)
ALBUMIN/GLOB SERPL: 1 (ref 0.4–1.6)
ALP SERPL-CCNC: 69 U/L (ref 50–136)
ALT SERPL-CCNC: 27 U/L (ref 12–65)
ANION GAP SERPL CALC-SCNC: 5 MMOL/L (ref 2–11)
APPEARANCE UR: CLEAR
AST SERPL-CCNC: 12 U/L (ref 15–37)
BACTERIA URNS QL MICRO: NEGATIVE /HPF
BASOPHILS # BLD: 0 K/UL (ref 0–0.2)
BASOPHILS NFR BLD: 1 % (ref 0–2)
BILIRUB SERPL-MCNC: 0.3 MG/DL (ref 0.2–1.1)
BILIRUB UR QL: NEGATIVE
BUN SERPL-MCNC: 11 MG/DL (ref 6–23)
CALCIUM SERPL-MCNC: 10.1 MG/DL (ref 8.3–10.4)
CASTS URNS QL MICRO: NORMAL /LPF (ref 0–2)
CHLORIDE SERPL-SCNC: 111 MMOL/L (ref 103–113)
CHOLEST SERPL-MCNC: 155 MG/DL
CO2 SERPL-SCNC: 26 MMOL/L (ref 21–32)
COLOR UR: NORMAL
CREAT SERPL-MCNC: 0.9 MG/DL (ref 0.6–1)
DIFFERENTIAL METHOD BLD: ABNORMAL
EOSINOPHIL # BLD: 0.1 K/UL (ref 0–0.8)
EOSINOPHIL NFR BLD: 1 % (ref 0.5–7.8)
EPI CELLS #/AREA URNS HPF: NORMAL /HPF (ref 0–5)
ERYTHROCYTE [DISTWIDTH] IN BLOOD BY AUTOMATED COUNT: 14.9 % (ref 11.9–14.6)
GLOBULIN SER CALC-MCNC: 3.6 G/DL (ref 2.8–4.5)
GLUCOSE SERPL-MCNC: 100 MG/DL (ref 65–100)
GLUCOSE UR STRIP.AUTO-MCNC: NEGATIVE MG/DL
HCT VFR BLD AUTO: 37.7 % (ref 35.8–46.3)
HDLC SERPL-MCNC: 34 MG/DL (ref 40–60)
HDLC SERPL: 4.6
HGB BLD-MCNC: 11.9 G/DL (ref 11.7–15.4)
HGB UR QL STRIP: NEGATIVE
IMM GRANULOCYTES # BLD AUTO: 0 K/UL (ref 0–0.5)
IMM GRANULOCYTES NFR BLD AUTO: 0 % (ref 0–5)
KETONES UR QL STRIP.AUTO: NEGATIVE MG/DL
LDLC SERPL CALC-MCNC: 104.2 MG/DL
LEUKOCYTE ESTERASE UR QL STRIP.AUTO: NEGATIVE
LYMPHOCYTES # BLD: 1.6 K/UL (ref 0.5–4.6)
LYMPHOCYTES NFR BLD: 27 % (ref 13–44)
MCH RBC QN AUTO: 23.7 PG (ref 26.1–32.9)
MCHC RBC AUTO-ENTMCNC: 31.6 G/DL (ref 31.4–35)
MCV RBC AUTO: 75.1 FL (ref 82–102)
MONOCYTES # BLD: 0.3 K/UL (ref 0.1–1.3)
MONOCYTES NFR BLD: 6 % (ref 4–12)
MUCOUS THREADS URNS QL MICRO: 0 /LPF
NEUTS SEG # BLD: 3.8 K/UL (ref 1.7–8.2)
NEUTS SEG NFR BLD: 65 % (ref 43–78)
NITRITE UR QL STRIP.AUTO: NEGATIVE
NRBC # BLD: 0 K/UL (ref 0–0.2)
PH UR STRIP: 6 (ref 5–9)
PLATELET # BLD AUTO: 389 K/UL (ref 150–450)
PMV BLD AUTO: 10.7 FL (ref 9.4–12.3)
POTASSIUM SERPL-SCNC: 4.4 MMOL/L (ref 3.5–5.1)
PROT SERPL-MCNC: 7.1 G/DL (ref 6.3–8.2)
PROT UR STRIP-MCNC: NEGATIVE MG/DL
RBC # BLD AUTO: 5.02 M/UL (ref 4.05–5.2)
RBC #/AREA URNS HPF: NORMAL /HPF (ref 0–5)
SODIUM SERPL-SCNC: 142 MMOL/L (ref 136–146)
SP GR UR REFRACTOMETRY: 1.01 (ref 1–1.02)
TRIGL SERPL-MCNC: 84 MG/DL (ref 35–150)
TSH, 3RD GENERATION: 0.01 UIU/ML (ref 0.36–3.74)
URINE CULTURE IF INDICATED: NORMAL
UROBILINOGEN UR QL STRIP.AUTO: 0.2 EU/DL (ref 0.2–1)
VLDLC SERPL CALC-MCNC: 16.8 MG/DL (ref 6–23)
WBC # BLD AUTO: 5.9 K/UL (ref 4.3–11.1)
WBC URNS QL MICRO: NORMAL /HPF (ref 0–4)

## 2023-12-06 LAB
EST. AVERAGE GLUCOSE BLD GHB EST-MCNC: 117 MG/DL
HBA1C MFR BLD: 5.7 % (ref 4.8–5.6)

## 2023-12-12 ENCOUNTER — OFFICE VISIT (OUTPATIENT)
Dept: INTERNAL MEDICINE CLINIC | Facility: CLINIC | Age: 33
End: 2023-12-12
Payer: COMMERCIAL

## 2023-12-12 VITALS
HEART RATE: 95 BPM | OXYGEN SATURATION: 100 % | BODY MASS INDEX: 49.08 KG/M2 | RESPIRATION RATE: 16 BRPM | HEIGHT: 63 IN | DIASTOLIC BLOOD PRESSURE: 100 MMHG | TEMPERATURE: 98.3 F | WEIGHT: 277 LBS | SYSTOLIC BLOOD PRESSURE: 150 MMHG

## 2023-12-12 DIAGNOSIS — K21.9 GASTROESOPHAGEAL REFLUX DISEASE WITHOUT ESOPHAGITIS: ICD-10-CM

## 2023-12-12 DIAGNOSIS — Z23 NEED FOR TDAP VACCINATION: ICD-10-CM

## 2023-12-12 DIAGNOSIS — E07.89 THYROID FULLNESS: ICD-10-CM

## 2023-12-12 DIAGNOSIS — R73.03 PREDIABETES: ICD-10-CM

## 2023-12-12 DIAGNOSIS — I10 PRIMARY HYPERTENSION: Primary | ICD-10-CM

## 2023-12-12 DIAGNOSIS — E66.01 CLASS 2 SEVERE OBESITY DUE TO EXCESS CALORIES WITH SERIOUS COMORBIDITY AND BODY MASS INDEX (BMI) OF 39.0 TO 39.9 IN ADULT (HCC): ICD-10-CM

## 2023-12-12 DIAGNOSIS — D57.3 SICKLE CELL TRAIT (HCC): ICD-10-CM

## 2023-12-12 DIAGNOSIS — E05.90 HYPERTHYROIDISM: ICD-10-CM

## 2023-12-12 DIAGNOSIS — Z11.4 SCREENING FOR HIV (HUMAN IMMUNODEFICIENCY VIRUS): ICD-10-CM

## 2023-12-12 DIAGNOSIS — Z11.59 ENCOUNTER FOR HEPATITIS C SCREENING TEST FOR LOW RISK PATIENT: ICD-10-CM

## 2023-12-12 PROBLEM — E66.812 CLASS 2 SEVERE OBESITY DUE TO EXCESS CALORIES WITH SERIOUS COMORBIDITY AND BODY MASS INDEX (BMI) OF 39.0 TO 39.9 IN ADULT: Status: ACTIVE | Noted: 2022-02-18

## 2023-12-12 PROCEDURE — 99214 OFFICE O/P EST MOD 30 MIN: CPT | Performed by: INTERNAL MEDICINE

## 2023-12-12 PROCEDURE — 3077F SYST BP >= 140 MM HG: CPT | Performed by: INTERNAL MEDICINE

## 2023-12-12 PROCEDURE — 90715 TDAP VACCINE 7 YRS/> IM: CPT | Performed by: INTERNAL MEDICINE

## 2023-12-12 PROCEDURE — 3080F DIAST BP >= 90 MM HG: CPT | Performed by: INTERNAL MEDICINE

## 2023-12-12 PROCEDURE — 90471 IMMUNIZATION ADMIN: CPT | Performed by: INTERNAL MEDICINE

## 2023-12-12 RX ORDER — PANTOPRAZOLE SODIUM 40 MG/1
40 TABLET, DELAYED RELEASE ORAL DAILY
Qty: 90 TABLET | Refills: 3 | Status: SHIPPED | OUTPATIENT
Start: 2023-12-12

## 2023-12-12 RX ORDER — AMLODIPINE BESYLATE 5 MG/1
5 TABLET ORAL DAILY
Qty: 30 TABLET | Refills: 5 | Status: SHIPPED | OUTPATIENT
Start: 2023-12-12

## 2023-12-12 ASSESSMENT — PATIENT HEALTH QUESTIONNAIRE - PHQ9
1. LITTLE INTEREST OR PLEASURE IN DOING THINGS: 1
SUM OF ALL RESPONSES TO PHQ9 QUESTIONS 1 & 2: 1
SUM OF ALL RESPONSES TO PHQ QUESTIONS 1-9: 1
2. FEELING DOWN, DEPRESSED OR HOPELESS: 0

## 2023-12-12 ASSESSMENT — ENCOUNTER SYMPTOMS
SINUS PRESSURE: 0
RHINORRHEA: 0
COUGH: 0
CHEST TIGHTNESS: 0
DIARRHEA: 0
SHORTNESS OF BREATH: 0
ABDOMINAL PAIN: 0
BLOOD IN STOOL: 0
NAUSEA: 0
VOMITING: 0

## 2023-12-12 NOTE — PROGRESS NOTES
[facility]      2023    Patient Name: Cailin Bass  :  1990    Subjective:    Chief Complaint:  Chief Complaint   Patient presents with    Medication Refill     Pt is here for refills on blood pressure medication. HPI Here for f/u visit; patient had fasting labs done recently and results were reviewed in detail today; She was noted to have low TSH; she went to Urgent Care last month for swelling in her neck;   HTN:  Patient compliant with taking blood pressure medications: Yes, but has been out of bp meds for 2 weeks  Discussed importance of following low sodium DASH diet, increasing physical activity, taking medications as ordered, decreasing alcohol intake, keeping f/u visits to recheck blood pressure, monitoring blood pressure at home and keeping a log, with goal blood pressure <140/90.   Home bp readings are: better when on medication      Past Medical History:   Diagnosis Date    GERD (gastroesophageal reflux disease)     Hypertension     PCOS (polycystic ovarian syndrome) 2022    Prediabetes 2022    Sickle cell trait ([address]) 2022       Past Surgical History:   Procedure Laterality Date     SECTION  10/2020       Family History   Problem Relation Age of Onset    Arthritis Mother     Other Father         Vertigo    Uterine Cancer Neg Hx     Ovarian Cancer Neg Hx     Colon Cancer Neg Hx     Breast Cancer Neg Hx        Social History     Tobacco Use    Smoking status: Former     Packs/day: .25     Types: Cigarettes     Start date: 2008     Quit date: 2018     Years since quittin.4    Smokeless tobacco: Never   Substance Use Topics    Alcohol use: Not Currently    Drug use: Never                 Current Outpatient Medications:     amLODIPine (NORVASC) 5 MG tablet, Take 1 tablet by mouth daily, Disp: 30 tablet, Rfl: 5    pantoprazole (PROTONIX) 40 MG tablet, Take 1 tablet by mouth daily, Disp: 90 tablet, Rfl: 3    medroxyPROGESTERone

## 2024-02-19 ENCOUNTER — OFFICE VISIT (OUTPATIENT)
Dept: OBGYN CLINIC | Age: 34
End: 2024-02-19
Payer: COMMERCIAL

## 2024-02-19 VITALS
HEIGHT: 65 IN | WEIGHT: 276 LBS | SYSTOLIC BLOOD PRESSURE: 126 MMHG | BODY MASS INDEX: 45.98 KG/M2 | DIASTOLIC BLOOD PRESSURE: 78 MMHG

## 2024-02-19 DIAGNOSIS — Z11.3 SCREEN FOR STD (SEXUALLY TRANSMITTED DISEASE): ICD-10-CM

## 2024-02-19 DIAGNOSIS — Z12.4 PAP SMEAR FOR CERVICAL CANCER SCREENING: Primary | ICD-10-CM

## 2024-02-19 DIAGNOSIS — E28.2 PCOS (POLYCYSTIC OVARIAN SYNDROME): ICD-10-CM

## 2024-02-19 PROCEDURE — 99213 OFFICE O/P EST LOW 20 MIN: CPT | Performed by: NURSE PRACTITIONER

## 2024-02-19 PROCEDURE — 3078F DIAST BP <80 MM HG: CPT | Performed by: NURSE PRACTITIONER

## 2024-02-19 PROCEDURE — 99459 PELVIC EXAMINATION: CPT | Performed by: NURSE PRACTITIONER

## 2024-02-19 PROCEDURE — 3074F SYST BP LT 130 MM HG: CPT | Performed by: NURSE PRACTITIONER

## 2024-02-19 NOTE — ASSESSMENT & PLAN NOTE
1/4/2023: Pt doing well with norethindrone (did have 2 menses in December when she was sick and on antibiotics)  Will continue to monitor  If menses continue to regulate, f/u for annual other call sooner if any concerns     4/24/23: pt reports 2-4 menses/month with prog only OCPs since Dec 2022  Pt was unable to afford slynd rx ($50/3 month supply)  Not currently sexually active, denies need for contraception  We discuss trying again to see if slynd will be approved (given failure with norethindrone) or D/C birth control and start cyclic or daily provera.   Pt would like to proceed with cyclic provera     8/16/23: pt did not like cyclic provera, would like to try depo-provera  rx sent and RTO in am  Down 10 lbs from April, encourage continued exercise and healthy eating  Will recheck PCOS labs in am fasting     8/30/2023: A1C in pre-diabetic range but improved, pt has continued to see steady weight loss  We discuss increasing activity by doubling current nl steps for the next month  Continue depo  Declines metformin due to previous S/E and declines Inositol at this time  RTO as scheduled for depo in Nov and 6 months for GYN US and visit    2/19/2024: pt continues to have BTB with depo  Pap today  D/W pt at length multiple options, risks, benefits, SE's about OCP's, Nuvaring, Patch, Nexplanon, Depo and IUD including but not limited to H/A, N/V, thromboembolic events, bleeding patterns, weight gain and efficacy.  Also, reminded pt that these methods do NOT protect against STD's - condoms are encouraged. Aches s/s reviewed    Patient denies h/o DVT, stroke, MI, migraines, liver disease or HTN  Has not failed tx with depo and OCPs. Declines nexplanon. Would like to try Mirena IUD. Order form completed today  Pt has not yet been able to keep appt for pelvic US, will do this first and if nl will then schedule Mirena insertion  Pt interested in trying glp-1 agent and will f/u with PCP to discuss. Still having difficulty

## 2024-02-19 NOTE — PROGRESS NOTES
Patient here to discuss BC.   Patient states that she took the Provera for approx 1 month but did not refill the RX due to her bleeding still being irregular.   Patient states she had a menses around new years mahendra and another one on 1/10/24.     Chaperone for Intimate Exam     Chaperone was offer accepted as part of the rooming process    Chaperone: Alley Nunn    LAST PAP:  10/14/2021, neg.,     LAST MAMMO:  never    LMP:  Patient's last menstrual period was 01/10/2024 (approximate).    BIRTH CONTROL:  depo injections-11/9/23    TOBACCO USE:  No    FAMILY HISTORY OF:   Breast Cancer:  No   Ovarian Cancer:  No   Uterine Cancer:  No   Colon Cancer:  No    Vitals:    02/19/24 1422   BP: 126/78   Site: Right Upper Arm   Position: Sitting   Weight: 125.2 kg (276 lb)   Height: 1.638 m (5' 4.5\")        ALLEY NUNN RN  02/19/24  2:33 PM

## 2024-02-19 NOTE — PROGRESS NOTES
Karli Hsu is a 34 y.o.  who is here today for followup (of note, pt was supposed to be seen for US but was unable to keep appt)    Pt seen 23 with the following concerns\"here for annual exam and PCOS followup.     Pt tried cyclic provera for 1 month. States she did not want to pay $10 for medication. So she then restarted mini pill but continued to have breakthrough bleeding. She would like to switch to depo. She knows she will have to watch her weight closely with depo but its stopped her bleeding in the past.\"     23: pt doing well, started depo shot on 23 Still having some bleeding every few days    2024: pt continues to have BTB with depo and would like to discuss add'l options. 3lb weight gain since last visit  Not currently sexually active for > 1 year      Patient's last menstrual period was 01/10/2024 (approximate).        Past Medical History:   Diagnosis Date    GERD (gastroesophageal reflux disease)     Hypertension     PCOS (polycystic ovarian syndrome) 2022    Prediabetes 2022    Sickle cell trait (H[facility]) 2022       Past Surgical History:   Procedure Laterality Date     SECTION  10/2020       Family History   Problem Relation Age of Onset    Arthritis Mother     Other Father         Vertigo    Uterine Cancer Neg Hx     Ovarian Cancer Neg Hx     Colon Cancer Neg Hx     Breast Cancer Neg Hx        Social History     Socioeconomic History    Marital status: Single     Spouse name: Not on file    Number of children: Not on file    Years of education: Not on file    Highest education level: Not on file   Occupational History    Not on file   Tobacco Use    Smoking status: Former     Current packs/day: 0.00     Average packs/day: 0.3 packs/day for 10.1 years (2.5 ttl pk-yrs)     Types: Cigarettes     Start date: 2008     Quit date: 2018     Years since quittin.6    Smokeless tobacco: Never   Substance and Sexual Activity    Alcohol

## 2024-02-19 NOTE — PROGRESS NOTES
I have reviewed the patient's visit today including history, exam and assessment by HARDEEP Tracy.  I agree with treatment/plan as above.    Jamir Cooper MD  4:11 PM  02/19/24

## 2024-02-22 LAB
C TRACH RRNA CVX QL NAA+PROBE: NEGATIVE
COLLECTION METHOD: NORMAL
CYTOLOGIST CVX/VAG CYTO: NORMAL
CYTOLOGY CVX/VAG DOC THIN PREP: NORMAL
HPV REFLEX: NORMAL
Lab: NORMAL
N GONORRHOEA RRNA CVX QL NAA+PROBE: NEGATIVE
PAP SOURCE: NORMAL
PATH REPORT.FINAL DX SPEC: NORMAL
STAT OF ADQ CVX/VAG CYTO-IMP: NORMAL

## 2024-02-23 LAB
Lab: NORMAL
REFERENCE LAB: NORMAL

## 2024-02-27 LAB
HPV OTHER HR TYPES: NEGATIVE
HPV TYPE 16: NEGATIVE
HPV TYPE 18: NEGATIVE
SPECIMEN SOURCE: NORMAL

## 2024-03-11 ENCOUNTER — OFFICE VISIT (OUTPATIENT)
Dept: OBGYN CLINIC | Age: 34
End: 2024-03-11

## 2024-03-11 ENCOUNTER — PROCEDURE VISIT (OUTPATIENT)
Dept: OBGYN CLINIC | Age: 34
End: 2024-03-11
Payer: COMMERCIAL

## 2024-03-11 VITALS
HEIGHT: 63 IN | SYSTOLIC BLOOD PRESSURE: 126 MMHG | WEIGHT: 276 LBS | DIASTOLIC BLOOD PRESSURE: 82 MMHG | BODY MASS INDEX: 48.9 KG/M2

## 2024-03-11 DIAGNOSIS — Z30.430 ENCOUNTER FOR IUD INSERTION: Primary | ICD-10-CM

## 2024-03-11 DIAGNOSIS — N92.6 IRREGULAR MENSES: Primary | ICD-10-CM

## 2024-03-11 DIAGNOSIS — E28.2 PCOS (POLYCYSTIC OVARIAN SYNDROME): ICD-10-CM

## 2024-03-11 DIAGNOSIS — N83.201 CYST OF RIGHT OVARY: ICD-10-CM

## 2024-03-11 PROCEDURE — 76830 TRANSVAGINAL US NON-OB: CPT | Performed by: OBSTETRICS & GYNECOLOGY

## 2024-03-11 RX ORDER — LEVONORGESTREL 52 MG/1
INTRAUTERINE DEVICE INTRAUTERINE
COMMUNITY
Start: 2024-02-20

## 2024-03-11 NOTE — PROGRESS NOTES
Patient here for gyn f/u with US due to irregular bleeding as well as IUD insertion.    Chaperone for Intimate Exam     Chaperone was offer accepted as part of the rooming process    Chaperone: Kali Nunn    Mirena insertion  NDC: 69106-163-40  LOT: [id number]  EXP: 04/2026  Preg: negative     LAST PAP:  10/14/2021, NEG., HPV cancelled     LAST MAMMO:  never     LMP:  Patient's last menstrual period was 01/10/2024 (approximate).    BIRTH CONTROL:  none-last depo inj. 11/9/2023    TOBACCO USE:  No    FAMILY HISTORY OF:   Breast Cancer:  No   Ovarian Cancer:  No   Uterine Cancer:  No   Colon Cancer:  No    Vitals:    03/11/24 1528   BP: 126/82   Site: Left Upper Arm   Position: Sitting   Weight: 125.2 kg (276 lb)   Height: 1.6 m (5' 3\")        KALI NUNN RN  03/11/24  3:36 PM

## 2024-03-11 NOTE — PROGRESS NOTES
Karli Hsu is a 34 y.o.  who is here for Mirena IUD insertion      Patient's last menstrual period was 01/10/2024 (approximate). Last depo injection 2023. UPT neg. Not sxually active for > 1 year    Past Medical History:   Diagnosis Date    GERD (gastroesophageal reflux disease)     Hypertension     PCOS (polycystic ovarian syndrome) 2022    Prediabetes 2022    Sickle cell trait (HCC) 2022       Past Surgical History:   Procedure Laterality Date     SECTION  10/2020       Family History   Problem Relation Age of Onset    Arthritis Mother     Other Father         Vertigo    Uterine Cancer Neg Hx     Ovarian Cancer Neg Hx     Colon Cancer Neg Hx     Breast Cancer Neg Hx        Social History     Socioeconomic History    Marital status: Single     Spouse name: Not on file    Number of children: Not on file    Years of education: Not on file    Highest education level: Not on file   Occupational History    Not on file   Tobacco Use    Smoking status: Former     Current packs/day: 0.00     Average packs/day: 0.3 packs/day for 10.1 years (2.5 ttl pk-yrs)     Types: Cigarettes     Start date: 2008     Quit date: 2018     Years since quittin.7    Smokeless tobacco: Never   Substance and Sexual Activity    Alcohol use: Not Currently    Drug use: Never    Sexual activity: Not Currently     Birth control/protection: None   Other Topics Concern    Not on file   Social History Narrative    Abuse: Feels safe at home, no history of physical abuse, no history of sexual abuse       Social Determinants of Health     Financial Resource Strain: Low Risk  (3/13/2023)    Overall Financial Resource Strain (CARDIA)     Difficulty of Paying Living Expenses: Not hard at all   Food Insecurity: Not on file (3/13/2023)   Transportation Needs: Unknown (3/13/2023)    PRAPARE - Transportation     Lack of Transportation (Medical): Not on file     Lack of Transportation (Non-Medical): No

## 2024-03-12 NOTE — PROGRESS NOTES
I have reviewed the patient's visit today including history, exam and assessment by HARDEEP Tracy.  I agree with treatment/plan as above.    Jamir Cooper MD  8:21 AM  03/12/24

## 2024-03-13 LAB
Lab: NORMAL
Lab: NORMAL
REFERENCE LAB: NORMAL

## 2024-03-14 ENCOUNTER — OFFICE VISIT (OUTPATIENT)
Dept: OBGYN CLINIC | Age: 34
End: 2024-03-14
Payer: COMMERCIAL

## 2024-03-14 VITALS
BODY MASS INDEX: 48.69 KG/M2 | DIASTOLIC BLOOD PRESSURE: 74 MMHG | SYSTOLIC BLOOD PRESSURE: 126 MMHG | WEIGHT: 274.8 LBS | HEIGHT: 63 IN

## 2024-03-14 DIAGNOSIS — Z30.431 INTRAUTERINE DEVICE SURVEILLANCE: Primary | ICD-10-CM

## 2024-03-14 PROCEDURE — 3074F SYST BP LT 130 MM HG: CPT | Performed by: NURSE PRACTITIONER

## 2024-03-14 PROCEDURE — 99212 OFFICE O/P EST SF 10 MIN: CPT | Performed by: NURSE PRACTITIONER

## 2024-03-14 PROCEDURE — 99459 PELVIC EXAMINATION: CPT | Performed by: NURSE PRACTITIONER

## 2024-03-14 PROCEDURE — 3078F DIAST BP <80 MM HG: CPT | Performed by: NURSE PRACTITIONER

## 2024-03-14 SDOH — ECONOMIC STABILITY: FOOD INSECURITY: WITHIN THE PAST 12 MONTHS, THE FOOD YOU BOUGHT JUST DIDN'T LAST AND YOU DIDN'T HAVE MONEY TO GET MORE.: NEVER TRUE

## 2024-03-14 SDOH — ECONOMIC STABILITY: INCOME INSECURITY: HOW HARD IS IT FOR YOU TO PAY FOR THE VERY BASICS LIKE FOOD, HOUSING, MEDICAL CARE, AND HEATING?: NOT HARD AT ALL

## 2024-03-14 SDOH — ECONOMIC STABILITY: FOOD INSECURITY: WITHIN THE PAST 12 MONTHS, YOU WORRIED THAT YOUR FOOD WOULD RUN OUT BEFORE YOU GOT MONEY TO BUY MORE.: NEVER TRUE

## 2024-03-14 ASSESSMENT — PATIENT HEALTH QUESTIONNAIRE - PHQ9
SUM OF ALL RESPONSES TO PHQ QUESTIONS 1-9: 0
SUM OF ALL RESPONSES TO PHQ QUESTIONS 1-9: 0
2. FEELING DOWN, DEPRESSED OR HOPELESS: 0
1. LITTLE INTEREST OR PLEASURE IN DOING THINGS: 0
SUM OF ALL RESPONSES TO PHQ9 QUESTIONS 1 & 2: 0
SUM OF ALL RESPONSES TO PHQ QUESTIONS 1-9: 0
SUM OF ALL RESPONSES TO PHQ QUESTIONS 1-9: 0

## 2024-03-14 NOTE — PROGRESS NOTES
I have reviewed the patient's visit today including history, exam and assessment by HARDEEP Tracy.  I agree with treatment/plan as above.    Jamir Cooper MD  11:34 AM  03/14/24   
Patient here for IUD concerns. Patient states she feels the strings \"poking her\" since her insertion earlier this week.     LAST PAP:  2/19/2024, neg., HPV neg.     LAST MAMMO:  never     LMP:  Patient's last menstrual period was 01/10/2024 (approximate).    BIRTH CONTROL:  IUD    TOBACCO USE:  No    FAMILY HISTORY OF:   Breast Cancer:  No   Ovarian Cancer:  No   Uterine Cancer:  No   Colon Cancer:  No    Vitals:    03/14/24 1100   BP: 126/74   Site: Right Upper Arm   Position: Sitting   Weight: 124.6 kg (274 lb 12.8 oz)   Height: 1.6 m (5' 3\")        KALI OHARA RN  03/14/24  11:10 AM     
 Ran Out of Food in the Last Year: Never true   Transportation Needs: Unknown (3/14/2024)    PRAPARE - Transportation     Lack of Transportation (Medical): Not on file     Lack of Transportation (Non-Medical): No   Physical Activity: Not on file   Stress: Not on file   Social Connections: Not on file   Intimate Partner Violence: Not on file   Housing Stability: Unknown (3/14/2024)    Housing Stability Vital Sign     Unable to Pay for Housing in the Last Year: Not on file     Number of Places Lived in the Last Year: Not on file     Unstable Housing in the Last Year: No           Objective:    Vitals:    03/14/24 1100   BP: 126/74   Site: Right Upper Arm   Position: Sitting   Weight: 124.6 kg (274 lb 12.8 oz)   Height: 1.6 m (5' 3\")         Constitutional:  well-developed, well-nourished, and in no distress.     Mental: Alert and awake. Oriented to person/place/time. Able to follow commands    Eyes: EOM nl, Sclera nl, Ocular Discharge not visualized    HENT: Normocephalic and atraumatic. Mouth/Throat: Mucous membranes are moist    External Ears: nl    Neck: Normal range of motion. No masses visualized       Pulmonary: Effort normal. No visualized signs of difficulty breathing or respiratory distress    Pelvic Exam:       External: normal female genitalia without lesions or masses       Vagina: normal without lesions or masses, scant clear physiologic discharge noted      Cervix: normal without lesions or masses, at level of introitus  1 string more sharp and pointed, trimmed to level of cervix, other string curled and soft left at 1-2 cm       Musculoskeletal: Normal range of motion. Normal gait with no signs of ataxia     Neurological: No Facial Asymmetry (Cranial nerve 7 motor function) No gaze palsy    Skin: No significant exanthematous lesions or discoloration noted on facial skin      Psych: Normal affect. No hallucinations              Assessment/Plan            Patient Active Problem List    Diagnosis Date

## 2024-03-14 NOTE — ASSESSMENT & PLAN NOTE
With known cervical elongation, cervix at introitus today, strings present 1-2 cm. 1 string soft and curled, the other appears more blunt and pointed. This string trimmed flush to cervix    Consider pessary if symptoms continue

## 2024-03-18 ENCOUNTER — HOSPITAL ENCOUNTER (EMERGENCY)
Age: 34
Discharge: HOME | End: 2024-03-18
Payer: COMMERCIAL

## 2024-03-18 VITALS
HEIGHT: 64 IN | WEIGHT: 274 LBS | HEART RATE: 97 BPM | OXYGEN SATURATION: 99 % | DIASTOLIC BLOOD PRESSURE: 100 MMHG | TEMPERATURE: 97.9 F | RESPIRATION RATE: 18 BRPM | BODY MASS INDEX: 46.78 KG/M2 | SYSTOLIC BLOOD PRESSURE: 150 MMHG

## 2024-03-18 DIAGNOSIS — M54.42 ACUTE BILATERAL LOW BACK PAIN WITH LEFT-SIDED SCIATICA: Primary | ICD-10-CM

## 2024-03-18 PROCEDURE — 99284 EMERGENCY DEPT VISIT MOD MDM: CPT

## 2024-03-18 PROCEDURE — 6360000002 HC RX W HCPCS: Performed by: NURSE PRACTITIONER

## 2024-03-18 PROCEDURE — 96372 THER/PROPH/DIAG INJ SC/IM: CPT

## 2024-03-18 PROCEDURE — 6370000000 HC RX 637 (ALT 250 FOR IP): Performed by: NURSE PRACTITIONER

## 2024-03-18 RX ORDER — NAPROXEN 500 MG/1
500 TABLET ORAL 2 TIMES DAILY WITH MEALS
Qty: 30 TABLET | Refills: 0 | Status: SHIPPED | OUTPATIENT
Start: 2024-03-18

## 2024-03-18 RX ORDER — KETOROLAC TROMETHAMINE 30 MG/ML
30 INJECTION, SOLUTION INTRAMUSCULAR; INTRAVENOUS ONCE
Status: COMPLETED | OUTPATIENT
Start: 2024-03-18 | End: 2024-03-18

## 2024-03-18 RX ORDER — DEXAMETHASONE SODIUM PHOSPHATE 10 MG/ML
10 INJECTION INTRAMUSCULAR; INTRAVENOUS ONCE
Status: COMPLETED | OUTPATIENT
Start: 2024-03-18 | End: 2024-03-18

## 2024-03-18 RX ORDER — LIDOCAINE 4 G/G
1 PATCH TOPICAL
Status: DISCONTINUED | OUTPATIENT
Start: 2024-03-18 | End: 2024-03-19 | Stop reason: HOSPADM

## 2024-03-18 RX ADMIN — DEXAMETHASONE SODIUM PHOSPHATE 10 MG: 10 INJECTION INTRAMUSCULAR; INTRAVENOUS at 20:44

## 2024-03-18 RX ADMIN — KETOROLAC TROMETHAMINE 30 MG: 30 INJECTION, SOLUTION INTRAMUSCULAR at 20:44

## 2024-03-18 ASSESSMENT — LIFESTYLE VARIABLES
HOW OFTEN DO YOU HAVE A DRINK CONTAINING ALCOHOL: NEVER
HOW MANY STANDARD DRINKS CONTAINING ALCOHOL DO YOU HAVE ON A TYPICAL DAY: PATIENT DOES NOT DRINK

## 2024-03-18 ASSESSMENT — PAIN SCALES - GENERAL
PAINLEVEL_OUTOF10: 4
PAINLEVEL_OUTOF10: 8

## 2024-03-18 ASSESSMENT — PAIN - FUNCTIONAL ASSESSMENT: PAIN_FUNCTIONAL_ASSESSMENT: 0-10

## 2024-03-19 NOTE — ED PROVIDER NOTES
than 2 seconds.   Neurological:      General: No focal deficit present.      Mental Status: She is alert and oriented to person, place, and time.        Procedures     Procedures    No orders of the defined types were placed in this encounter.       Medications given during this emergency department visit:  Medications   lidocaine 4 % external patch 1 patch (1 patch TransDERmal Patch Applied 3/18/24 2044)   ketorolac (TORADOL) injection 30 mg (30 mg IntraMUSCular Given 3/18/24 2044)   dexAMETHasone (DECADRON) Oral 10 mg (10 mg Oral Given 3/18/24 2044)       New Prescriptions    NAPROXEN (NAPROSYN) 500 MG TABLET    Take 1 tablet by mouth 2 times daily (with meals)        Past Medical History:   Diagnosis Date    GERD (gastroesophageal reflux disease)     Hypertension     PCOS (polycystic ovarian syndrome) 2022    Prediabetes 2022    Sickle cell trait (HCC) 2022        Past Surgical History:   Procedure Laterality Date     SECTION  10/2020        Social History     Socioeconomic History    Marital status: Single   Tobacco Use    Smoking status: Former     Current packs/day: 0.00     Average packs/day: 0.3 packs/day for 10.1 years (2.5 ttl pk-yrs)     Types: Cigarettes     Start date: 2008     Quit date: 2018     Years since quittin.7    Smokeless tobacco: Never   Substance and Sexual Activity    Alcohol use: Not Currently    Drug use: Never    Sexual activity: Not Currently     Birth control/protection: None   Social History Narrative    Abuse: Feels safe at home, no history of physical abuse, no history of sexual abuse       Social Determinants of Health     Financial Resource Strain: Low Risk  (3/14/2024)    Overall Financial Resource Strain (CARDIA)     Difficulty of Paying Living Expenses: Not hard at all   Food Insecurity: No Food Insecurity (3/14/2024)    Hunger Vital Sign     Worried About Running Out of Food in the Last Year: Never true     Ran Out of Food in the Last

## 2024-03-19 NOTE — ED TRIAGE NOTES
Pt having back pain.  \"Pain is radiating on each side of lower back and sending sharp pains down my L leg.\"  Pain started today and has been getting worse.  Denies any issue w/ urinating.

## 2024-03-19 NOTE — ED NOTES
I have reviewed discharge instructions with the patient.  The patient verbalized understanding.    Patient left ED via Discharge Method: ambulatory to Home with self.    Opportunity for questions and clarification provided.       Patient given 1 scripts.         To continue your aftercare when you leave the hospital, you may receive an automated call from our care team to check in on how you are doing.  This is a free service and part of our promise to provide the best care and service to meet your aftercare needs.” If you have questions, or wish to unsubscribe from this service please call [phone].  Thank you for Choosing our [facility] Emergency Department.

## 2024-03-19 NOTE — DISCHARGE INSTRUCTIONS
As we discussed, your symptoms are consistent with sciatic type pain.  Take medication as prescribed.  Perform gentle stretching of your lower back but avoid strenuous activity for the next several days.  Apply ice for 15 to 20 minutes every few hours to help with pain.  Over-the-counter muscle rubs or Voltaren gel can also help.  Return to the emergency department for any new, worsening, or concerning symptoms.

## 2024-04-08 ENCOUNTER — OFFICE VISIT (OUTPATIENT)
Dept: OBGYN CLINIC | Age: 34
End: 2024-04-08
Payer: COMMERCIAL

## 2024-04-08 VITALS
HEIGHT: 63 IN | BODY MASS INDEX: 48.73 KG/M2 | WEIGHT: 275 LBS | DIASTOLIC BLOOD PRESSURE: 86 MMHG | SYSTOLIC BLOOD PRESSURE: 134 MMHG

## 2024-04-08 DIAGNOSIS — Z30.431 INTRAUTERINE DEVICE SURVEILLANCE: Primary | ICD-10-CM

## 2024-04-08 PROCEDURE — 3079F DIAST BP 80-89 MM HG: CPT | Performed by: NURSE PRACTITIONER

## 2024-04-08 PROCEDURE — 99459 PELVIC EXAMINATION: CPT | Performed by: NURSE PRACTITIONER

## 2024-04-08 PROCEDURE — 3075F SYST BP GE 130 - 139MM HG: CPT | Performed by: NURSE PRACTITIONER

## 2024-04-08 PROCEDURE — 99213 OFFICE O/P EST LOW 20 MIN: CPT | Performed by: NURSE PRACTITIONER

## 2024-04-08 RX ORDER — CEPHALEXIN 500 MG/1
500 CAPSULE ORAL 4 TIMES DAILY
Qty: 20 CAPSULE | Refills: 0 | Status: SHIPPED | OUTPATIENT
Start: 2024-04-08 | End: 2024-04-13

## 2024-04-08 NOTE — PROGRESS NOTES
I have reviewed the patient's visit today including history, exam and assessment by HARDEEP Tracy.  I agree with treatment/plan as above.    Jamir Cooper MD  11:03 AM  04/08/24

## 2024-04-08 NOTE — PROGRESS NOTES
Chaperone for Intimate Exam     Chaperone was offer accepted as part of the rooming process    Chaperone: Dorothy GARCIA CMA

## 2024-04-08 NOTE — ASSESSMENT & PLAN NOTE
3/14/2024:  With known cervical elongation, cervix at introitus today, strings present 1-2 cm. 1 string soft and curled, the other appears more blunt and pointed. This string trimmed flush to cervix     4/8/24: strings flush with os (trimmed at last visit per pt request)

## 2024-04-08 NOTE — PROGRESS NOTES
Karli Hsu is a 34 y.o.  who is here for IUD string check. Also has area on mons pubis that keeps draining and healing. She stopped shaving and got a wax instead.       No LMP recorded. (Menstrual status: IUD).          Past Medical History:   Diagnosis Date    GERD (gastroesophageal reflux disease)     Hypertension     PCOS (polycystic ovarian syndrome) 2022    Prediabetes 2022    Sickle cell trait (HCC) 2022       Past Surgical History:   Procedure Laterality Date     SECTION  10/2020       Family History   Problem Relation Age of Onset    Arthritis Mother     Other Father         Vertigo    Uterine Cancer Neg Hx     Ovarian Cancer Neg Hx     Colon Cancer Neg Hx     Breast Cancer Neg Hx        Social History     Socioeconomic History    Marital status: Single     Spouse name: Not on file    Number of children: Not on file    Years of education: Not on file    Highest education level: Not on file   Occupational History    Not on file   Tobacco Use    Smoking status: Former     Current packs/day: 0.00     Average packs/day: 0.3 packs/day for 10.1 years (2.5 ttl pk-yrs)     Types: Cigarettes     Start date: 2008     Quit date: 2018     Years since quittin.8    Smokeless tobacco: Never   Substance and Sexual Activity    Alcohol use: Not Currently    Drug use: Never    Sexual activity: Not Currently     Birth control/protection: None   Other Topics Concern    Not on file   Social History Narrative    Abuse: Feels safe at home, no history of physical abuse, no history of sexual abuse       Social Determinants of Health     Financial Resource Strain: Low Risk  (3/14/2024)    Overall Financial Resource Strain (CARDIA)     Difficulty of Paying Living Expenses: Not hard at all   Food Insecurity: No Food Insecurity (3/14/2024)    Hunger Vital Sign     Worried About Running Out of Food in the Last Year: Never true     Ran Out of Food in the Last Year: Never true

## 2024-04-08 NOTE — PROGRESS NOTES
Pt comes in today for IUD string check.     LAST PAP:  02/19/2024 negative HPV negative     LAST MAMMO:  never     LMP:  No LMP recorded. (Menstrual status: IUD).    BIRTH CONTROL:  IUD    TOBACCO USE:  No    FAMILY HISTORY OF:   Breast Cancer:  No   Ovarian Cancer:  No   Uterine Cancer:  No   Colon Cancer:  No    Vitals:    04/08/24 1031   BP: 134/86   Site: Left Upper Arm   Position: Sitting   Weight: 124.7 kg (275 lb)   Height: 1.6 m (5' 3\")        Dorothy Salgado MA  04/08/24  10:39 AM

## 2024-06-01 DIAGNOSIS — I10 PRIMARY HYPERTENSION: ICD-10-CM

## 2024-06-03 RX ORDER — AMLODIPINE BESYLATE 5 MG/1
5 TABLET ORAL DAILY
Qty: 30 TABLET | Refills: 0 | OUTPATIENT
Start: 2024-06-03

## 2024-06-12 DIAGNOSIS — E05.90 HYPERTHYROIDISM: ICD-10-CM

## 2024-06-12 DIAGNOSIS — I10 PRIMARY HYPERTENSION: Primary | ICD-10-CM

## 2024-06-12 DIAGNOSIS — R73.03 PREDIABETES: ICD-10-CM

## 2024-06-18 ENCOUNTER — NURSE ONLY (OUTPATIENT)
Dept: INTERNAL MEDICINE CLINIC | Facility: CLINIC | Age: 34
End: 2024-06-18

## 2024-06-18 DIAGNOSIS — I10 PRIMARY HYPERTENSION: ICD-10-CM

## 2024-06-18 DIAGNOSIS — R73.03 PREDIABETES: ICD-10-CM

## 2024-06-18 DIAGNOSIS — E05.90 HYPERTHYROIDISM: ICD-10-CM

## 2024-06-18 LAB
ALBUMIN SERPL-MCNC: 3.9 G/DL (ref 3.5–5)
ALBUMIN/GLOB SERPL: 1.3 (ref 1–1.9)
ALP SERPL-CCNC: 65 U/L (ref 35–104)
ALT SERPL-CCNC: 20 U/L (ref 12–65)
ANION GAP SERPL CALC-SCNC: 12 MMOL/L (ref 9–18)
APPEARANCE UR: CLEAR
AST SERPL-CCNC: 22 U/L (ref 15–37)
BACTERIA URNS QL MICRO: ABNORMAL /HPF
BASOPHILS # BLD: 0.1 K/UL (ref 0–0.2)
BASOPHILS NFR BLD: 1 % (ref 0–2)
BILIRUB SERPL-MCNC: <0.2 MG/DL (ref 0–1.2)
BILIRUB UR QL: NEGATIVE
BUN SERPL-MCNC: 11 MG/DL (ref 6–23)
CALCIUM SERPL-MCNC: 9.6 MG/DL (ref 8.8–10.2)
CASTS URNS QL MICRO: 0 /LPF
CHLORIDE SERPL-SCNC: 103 MMOL/L (ref 98–107)
CHOLEST SERPL-MCNC: 194 MG/DL (ref 0–200)
CO2 SERPL-SCNC: 25 MMOL/L (ref 20–28)
COLOR UR: ABNORMAL
CREAT SERPL-MCNC: 0.8 MG/DL (ref 0.6–1.1)
CRYSTALS URNS QL MICRO: 0 /LPF
DIFFERENTIAL METHOD BLD: ABNORMAL
EOSINOPHIL # BLD: 0.2 K/UL (ref 0–0.8)
EOSINOPHIL NFR BLD: 4 % (ref 0.5–7.8)
EPI CELLS #/AREA URNS HPF: ABNORMAL /HPF
ERYTHROCYTE [DISTWIDTH] IN BLOOD BY AUTOMATED COUNT: 15.2 % (ref 11.9–14.6)
EST. AVERAGE GLUCOSE BLD GHB EST-MCNC: 127 MG/DL
GLOBULIN SER CALC-MCNC: 3.1 G/DL (ref 2.3–3.5)
GLUCOSE SERPL-MCNC: 86 MG/DL (ref 70–99)
GLUCOSE UR STRIP.AUTO-MCNC: NEGATIVE MG/DL
HBA1C MFR BLD: 6.1 % (ref 0–5.6)
HCT VFR BLD AUTO: 38.6 % (ref 35.8–46.3)
HDLC SERPL-MCNC: 43 MG/DL (ref 40–60)
HDLC SERPL: 4.5 (ref 0–5)
HGB BLD-MCNC: 12 G/DL (ref 11.7–15.4)
HGB UR QL STRIP: ABNORMAL
IMM GRANULOCYTES # BLD AUTO: 0 K/UL (ref 0–0.5)
IMM GRANULOCYTES NFR BLD AUTO: 0 % (ref 0–5)
KETONES UR QL STRIP.AUTO: NEGATIVE MG/DL
LDLC SERPL CALC-MCNC: 133 MG/DL (ref 0–100)
LEUKOCYTE ESTERASE UR QL STRIP.AUTO: NEGATIVE
LYMPHOCYTES # BLD: 1.6 K/UL (ref 0.5–4.6)
LYMPHOCYTES NFR BLD: 28 % (ref 13–44)
MCH RBC QN AUTO: 23.8 PG (ref 26.1–32.9)
MCHC RBC AUTO-ENTMCNC: 31.1 G/DL (ref 31.4–35)
MCV RBC AUTO: 76.4 FL (ref 82–102)
MONOCYTES # BLD: 0.5 K/UL (ref 0.1–1.3)
MONOCYTES NFR BLD: 9 % (ref 4–12)
MUCOUS THREADS URNS QL MICRO: 0 /LPF
NEUTS SEG # BLD: 3.3 K/UL (ref 1.7–8.2)
NEUTS SEG NFR BLD: 58 % (ref 43–78)
NITRITE UR QL STRIP.AUTO: NEGATIVE
NRBC # BLD: 0 K/UL (ref 0–0.2)
OTHER OBSERVATIONS: ABNORMAL
PH UR STRIP: 6 (ref 5–9)
PLATELET # BLD AUTO: 329 K/UL (ref 150–450)
PMV BLD AUTO: 10.7 FL (ref 9.4–12.3)
POTASSIUM SERPL-SCNC: 4.4 MMOL/L (ref 3.5–5.1)
PROT SERPL-MCNC: 6.9 G/DL (ref 6.3–8.2)
PROT UR STRIP-MCNC: NEGATIVE MG/DL
RBC # BLD AUTO: 5.05 M/UL (ref 4.05–5.2)
RBC #/AREA URNS HPF: ABNORMAL /HPF
SODIUM SERPL-SCNC: 140 MMOL/L (ref 136–145)
SP GR UR REFRACTOMETRY: 1.01 (ref 1–1.02)
T4 FREE SERPL-MCNC: 1.1 NG/DL (ref 0.9–1.7)
TRIGL SERPL-MCNC: 91 MG/DL (ref 0–150)
TSH, 3RD GENERATION: 1.56 UIU/ML (ref 0.27–4.2)
URINE CULTURE IF INDICATED: ABNORMAL
UROBILINOGEN UR QL STRIP.AUTO: 0.2 EU/DL (ref 0.2–1)
VLDLC SERPL CALC-MCNC: 18 MG/DL (ref 6–23)
WBC # BLD AUTO: 5.8 K/UL (ref 4.3–11.1)
WBC URNS QL MICRO: ABNORMAL /HPF

## 2024-06-18 RX ORDER — AMLODIPINE BESYLATE 5 MG/1
5 TABLET ORAL DAILY
Qty: 30 TABLET | Refills: 5 | Status: SHIPPED | OUTPATIENT
Start: 2024-06-18

## 2024-06-18 NOTE — TELEPHONE ENCOUNTER
Pt visited office, requ refill of amlodipine 5mg (none left)    Sent to [facility] on [address] in [place]

## 2024-06-21 NOTE — TELEPHONE ENCOUNTER
Physical Therapy  DATE: 2024    NAME: Mahi Vernon  MRN: [id number]   : 1947    Patient not seen this date for Physical Therapy due to:      [] Cancel by RN or physician due to:    [] Hemodialysis    [] Critical Lab Value Level     [] Blood transfusion in progress    [] Acute or unstable cardiovascular status   _MAP < 55 or more than >115  _HR < 40 or > 130    [] Acute or unstable pulmonary status   -FiO2 > 60%   _RR < 5 or >40    _O2 sats < 85%    [] Strict Bedrest    [] Off Unit for surgery or procedure    [] Off Unit for testing       [] Pending imaging to R/O fracture    [] Refusal by Patient      [x] Other: cancel in morning due to HD, cancel in afternoon due to still finishing lunch after HD. Patient politely requested to be able to finish lunch.        [] PT being discontinued at this time. Patient independent. No further needs.     [] PT being discontinued at this time as the patient has been transferred to hospice care. No further needs.      Micheline Nicholson, PT      Pt lvm stating her period has been on for 2 weeks and wants it to stop. Called pt back, pt stated she just started Depo in August and the bleeding is not that heavy but can be at times. Informed pt that periods can be irregular when first starting depo but we do recommend waiting to see how it is after her second shot. Advised pt that she can take 800mg of Ibuprofen if she has cramping and if she is bleeding very heavy like soaking a pad or more in an hour or less then she will need to go to the ER. Pt voiced understanding.

## 2024-06-25 ENCOUNTER — TELEMEDICINE (OUTPATIENT)
Dept: INTERNAL MEDICINE CLINIC | Facility: CLINIC | Age: 34
End: 2024-06-25
Payer: COMMERCIAL

## 2024-06-25 DIAGNOSIS — K21.9 GASTROESOPHAGEAL REFLUX DISEASE WITHOUT ESOPHAGITIS: ICD-10-CM

## 2024-06-25 DIAGNOSIS — D57.3 SICKLE CELL TRAIT (HCC): ICD-10-CM

## 2024-06-25 DIAGNOSIS — R73.03 PREDIABETES: ICD-10-CM

## 2024-06-25 DIAGNOSIS — I10 PRIMARY HYPERTENSION: Primary | ICD-10-CM

## 2024-06-25 DIAGNOSIS — E66.01 CLASS 2 SEVERE OBESITY DUE TO EXCESS CALORIES WITH SERIOUS COMORBIDITY AND BODY MASS INDEX (BMI) OF 39.0 TO 39.9 IN ADULT (HCC): ICD-10-CM

## 2024-06-25 DIAGNOSIS — N76.0 ACUTE VAGINITIS: ICD-10-CM

## 2024-06-25 DIAGNOSIS — E28.2 PCOS (POLYCYSTIC OVARIAN SYNDROME): ICD-10-CM

## 2024-06-25 PROBLEM — R03.0 ELEVATED BLOOD PRESSURE READING WITHOUT DIAGNOSIS OF HYPERTENSION: Status: RESOLVED | Noted: 2022-01-14 | Resolved: 2024-06-25

## 2024-06-25 PROCEDURE — 99214 OFFICE O/P EST MOD 30 MIN: CPT | Performed by: INTERNAL MEDICINE

## 2024-06-25 RX ORDER — METFORMIN HYDROCHLORIDE 500 MG/1
500 TABLET, EXTENDED RELEASE ORAL
Qty: 30 TABLET | Refills: 5 | Status: SHIPPED | OUTPATIENT
Start: 2024-06-25

## 2024-06-25 ASSESSMENT — ENCOUNTER SYMPTOMS: RESPIRATORY NEGATIVE: 1

## 2024-06-25 NOTE — PROGRESS NOTES
Karli Hsu, was evaluated through a synchronous (real-time) audio-video encounter. The patient (or guardian if applicable) is aware that this is a billable service, which includes applicable co-pays. This Virtual Visit was conducted with patient's (and/or legal guardian's) consent. Patient identification was verified, and a caregiver was present when appropriate.   The patient was located at Home: [address] [place] [address] [address] [place] [id number]  Provider was located at Facility (Appt Dept): [address] [place] [address] [address] [place] [phone]  Confirm you are appropriately licensed, registered, or certified to deliver care in the state where the patient is located as indicated above. If you are not or unsure, please re-schedule the visit: Yes, I confirm.     Karli Hsu (:  1990) is a Established patient, presenting virtually for evaluation of the following:    Assessment & Plan   Below is the assessment and plan developed based on review of pertinent history, physical exam, labs, studies, and medications.  1. Primary hypertension  Recommended low sodium diet; Goal: take meds as prescribed, monitor blood pressure at home and call with readings.       -     Comprehensive Metabolic Panel; Future  -     Lipid Panel; Future  -     TSH; Future  -     T4, Free; Future  -     Urinalysis; Future  2. Gastroesophageal reflux disease without esophagitis  Stable, continue medication, diet.     -     CBC with Auto Differential; Future  3. PCOS (polycystic ovarian syndrome)  Stable.     4. Class 2 severe obesity due to excess calories with serious comorbidity and body mass index (BMI) of 39.0 to 39.9 in adult (HCC)  Recommended low fat, low cholesterol, low carbohydrate diet.  Recommended increasing fresh fruits and vegetables in diet, eating lean meats, like fish and poultry, that are baked, broiled or grilled, not fried.  Recommended regular exercise, 30 minutes of aerobic activity

## 2024-07-10 ENCOUNTER — APPOINTMENT (OUTPATIENT)
Dept: GENERAL RADIOLOGY | Age: 34
End: 2024-07-10
Payer: COMMERCIAL

## 2024-07-10 ENCOUNTER — HOSPITAL ENCOUNTER (EMERGENCY)
Age: 34
Discharge: HOME | End: 2024-07-10
Payer: COMMERCIAL

## 2024-07-10 VITALS
RESPIRATION RATE: 18 BRPM | TEMPERATURE: 98 F | OXYGEN SATURATION: 96 % | HEART RATE: 96 BPM | HEIGHT: 64 IN | SYSTOLIC BLOOD PRESSURE: 145 MMHG | BODY MASS INDEX: 47.46 KG/M2 | DIASTOLIC BLOOD PRESSURE: 90 MMHG | WEIGHT: 278 LBS

## 2024-07-10 DIAGNOSIS — B96.89 BACTERIAL URI: Primary | ICD-10-CM

## 2024-07-10 DIAGNOSIS — J06.9 BACTERIAL URI: Primary | ICD-10-CM

## 2024-07-10 PROCEDURE — 99283 EMERGENCY DEPT VISIT LOW MDM: CPT

## 2024-07-10 PROCEDURE — 71046 X-RAY EXAM CHEST 2 VIEWS: CPT

## 2024-07-10 RX ORDER — ALBUTEROL SULFATE 90 UG/1
2 AEROSOL, METERED RESPIRATORY (INHALATION) EVERY 6 HOURS PRN
Qty: 18 G | Refills: 1 | Status: SHIPPED | OUTPATIENT
Start: 2024-07-10

## 2024-07-10 RX ORDER — PREDNISONE 20 MG/1
20 TABLET ORAL 2 TIMES DAILY
Qty: 10 TABLET | Refills: 0 | Status: SHIPPED | OUTPATIENT
Start: 2024-07-10 | End: 2024-07-15

## 2024-07-10 RX ORDER — AZITHROMYCIN 250 MG/1
TABLET, FILM COATED ORAL
Qty: 6 TABLET | Refills: 0 | Status: SHIPPED | OUTPATIENT
Start: 2024-07-10 | End: 2024-07-20

## 2024-07-10 ASSESSMENT — LIFESTYLE VARIABLES
HOW MANY STANDARD DRINKS CONTAINING ALCOHOL DO YOU HAVE ON A TYPICAL DAY: PATIENT DOES NOT DRINK
HOW OFTEN DO YOU HAVE A DRINK CONTAINING ALCOHOL: NEVER

## 2024-07-10 ASSESSMENT — PAIN SCALES - GENERAL: PAINLEVEL_OUTOF10: 8

## 2024-07-10 ASSESSMENT — PAIN DESCRIPTION - LOCATION: LOCATION: CHEST

## 2024-07-10 ASSESSMENT — ENCOUNTER SYMPTOMS: COUGH: 1

## 2024-07-10 NOTE — ED TRIAGE NOTES
Pt presents with cough x a week and a half. Pt states she feels like she can't cough mucous up.    Julita Franklin RN

## 2024-07-11 NOTE — DISCHARGE INSTRUCTIONS
Take the medications as prescribed to treat your symptoms.  Follow-up with your family doctor as needed.  Return to the emergency department as necessary for new or worsening symptoms.

## 2024-07-11 NOTE — ED PROVIDER NOTES
[facility]  Emergency Department    DISPOSITION Decision To Discharge 07/10/2024 08:26:24 PM       ICD-10-CM    1. Bacterial URI  J06.9     B96.89         ED Course      Complexity of Problems Addressed:  1 acute illness    Data Reviewed and Analyzed:  Category 1:   I reviewed external records: provider visit note from PCP.  I ordered each unique test.  I interpreted the results of each unique test.      Category 2:   I interpreted the X-rays no evidence of pneumothorax.    Category 3: Discussion of management or test interpretation.  Patient is a well-appearing 34-year-old female who presents for a week and a half of persistent cough.  Intermittent productivity to the cough.  History of underlying asthma.  Chest x-ray shows no obvious focal pneumonia.  No concerning lung sounds on auscultation.  Will plan to cover for bacterial infection with a Z-Chris.  Will also send with albuterol inhaler and 5-day course of prednisone.  Discussed findings on exam and treatment plan with patient who reports understanding and is agreeable to proceed as discussed.  Patient stable for discharge at this time.    Is this patient to be included in the SEP-1 core measure due to severe sepsis or septic shock? No Exclusion criteria - the patient is NOT to be included for SEP-1 Core Measure due to: 2+ SIRS criteria are not met     HPI   Karli Hsu is a 34 y.o. female with a history of asthma who presents to the ED with complaint of cough for the past 1 to 1-1/2 weeks.  She states that it is a constant cough that seems to be getting worse.  She states sometimes she gets some mucus production but a lot of times a dry cough.  She states that it is keeping her from being able to get good sleep at night.  She does have underlying asthma but does not have an inhaler.  She denies any fevers, chest pain, shortness of breath, or any other symptoms.    ROS   Review of Systems   Respiratory:  Positive for cough.

## 2024-08-20 ENCOUNTER — OFFICE VISIT (OUTPATIENT)
Dept: OBGYN CLINIC | Age: 34
End: 2024-08-20
Payer: COMMERCIAL

## 2024-08-20 VITALS
DIASTOLIC BLOOD PRESSURE: 90 MMHG | WEIGHT: 281 LBS | BODY MASS INDEX: 49.79 KG/M2 | HEIGHT: 63 IN | SYSTOLIC BLOOD PRESSURE: 148 MMHG

## 2024-08-20 DIAGNOSIS — N90.89 LABIAL IRRITATION: Primary | ICD-10-CM

## 2024-08-20 PROCEDURE — 99459 PELVIC EXAMINATION: CPT | Performed by: NURSE PRACTITIONER

## 2024-08-20 PROCEDURE — 3077F SYST BP >= 140 MM HG: CPT | Performed by: NURSE PRACTITIONER

## 2024-08-20 PROCEDURE — 99213 OFFICE O/P EST LOW 20 MIN: CPT | Performed by: NURSE PRACTITIONER

## 2024-08-20 PROCEDURE — 3080F DIAST BP >= 90 MM HG: CPT | Performed by: NURSE PRACTITIONER

## 2024-08-20 RX ORDER — TRIAMCINOLONE ACETONIDE 1 MG/G
OINTMENT TOPICAL 2 TIMES DAILY
Qty: 30 G | Refills: 0 | Status: SHIPPED | OUTPATIENT
Start: 2024-08-20

## 2024-08-20 NOTE — ASSESSMENT & PLAN NOTE
Eczetamous like rash noted to bilateral upper labia minora  Will try topical steroid cream  Vaginal hygiene reviewed - no worrisome practices. We discuss trial of different pantiliner brand and changing frequently while treating with steroid cream   Writer called patient three times with no answer. Writer advised patient to go to the ED.

## 2024-08-20 NOTE — PROGRESS NOTES
Karli Hsu is a 34 y.o.  who is here with concern for vaginal itching on the outside. Started approx 2 months ago. No symptoms of discharge, odor or UTI symptoms. Was seen at urgent care and treated for BV but swab for vaginal infections came back negative.         Patient's last menstrual period was 2024 (approximate).  Date of last Cervical Cancer screen (HPV or PAP): 2024 neg, HPV neg  Mirena IUD inserted 3/11/2024        Past Medical History:   Diagnosis Date    GERD (gastroesophageal reflux disease)     Hypertension     PCOS (polycystic ovarian syndrome) 2022    Prediabetes 2022    Sickle cell trait (HCC) 2022       Past Surgical History:   Procedure Laterality Date     SECTION  10/2020       Family History   Problem Relation Age of Onset    Arthritis Mother     Other Father         Vertigo    Uterine Cancer Neg Hx     Ovarian Cancer Neg Hx     Colon Cancer Neg Hx     Breast Cancer Neg Hx        Social History     Socioeconomic History    Marital status: Single     Spouse name: Not on file    Number of children: Not on file    Years of education: Not on file    Highest education level: Not on file   Occupational History    Not on file   Tobacco Use    Smoking status: Former     Current packs/day: 0.00     Average packs/day: 0.3 packs/day for 10.1 years (2.5 ttl pk-yrs)     Types: Cigarettes     Start date: 2008     Quit date: 2018     Years since quittin.1    Smokeless tobacco: Never   Substance and Sexual Activity    Alcohol use: Not Currently    Drug use: Never    Sexual activity: Yes     Birth control/protection: I.U.D.   Other Topics Concern    Not on file   Social History Narrative    Abuse: Feels safe at home, no history of physical abuse, no history of sexual abuse       Social Determinants of Health     Financial Resource Strain: Low Risk  (3/14/2024)    Overall Financial Resource Strain (CARDIA)     Difficulty of Paying Living

## 2024-08-20 NOTE — PROGRESS NOTES
Patient comes in today for follow up from urgent care visit two weeks ago for vaginal itching. Patient states she is still having the same itch. Patient states the itch is around her clitoris area not inside the vagina. Patient states she was treated for BV. Patient states she was told the swabs that was performed were negative for vaginal infections. Patient states she was told to follow up with our office since it could be a side effect of her IUD.     LAST PAP:  02/19/2024, Negative     LAST MAMMO:  Never    LMP:  Patient's last menstrual period was 07/17/2024 (approximate).    BIRTH CONTROL:  IUD    TOBACCO USE:  No    FAMILY HISTORY OF:   Breast Cancer:  No   Ovarian Cancer:  No   Uterine Cancer:  No   Colon Cancer:  No    Vitals:    08/20/24 0927   BP: (!) 148/90   Site: Right Upper Arm   Position: Sitting   Weight: 127.5 kg (281 lb)   Height: 1.6 m (5' 3\")      Chaperone for Intimate Exam     Chaperone was offer accepted as part of the rooming process    Chaperone: Lisa Espinoza MA  08/20/24  9:40 AM

## 2024-10-01 DIAGNOSIS — K21.9 GASTROESOPHAGEAL REFLUX DISEASE WITHOUT ESOPHAGITIS: ICD-10-CM

## 2024-10-01 RX ORDER — PANTOPRAZOLE SODIUM 40 MG/1
40 TABLET, DELAYED RELEASE ORAL DAILY
Qty: 90 TABLET | Refills: 0 | OUTPATIENT
Start: 2024-10-01

## 2024-10-10 DIAGNOSIS — I10 PRIMARY HYPERTENSION: ICD-10-CM

## 2024-10-10 DIAGNOSIS — K21.9 GASTROESOPHAGEAL REFLUX DISEASE WITHOUT ESOPHAGITIS: ICD-10-CM

## 2024-10-10 NOTE — TELEPHONE ENCOUNTER
Medication Refill Request    Name of Medication : amlodipine    Strength of Medication: 5 mg    Directions: once daily    30 day or 90 day supply: 30    Preferred Pharmacy: [facility] [address] Appt. Date: 6/5/24    Next Appt. Date: none    Additional Information For Provider:     Medication Refill Request    Name of Medication : pantoprazole    Strength of Medication: 40 mg    Directions: once daily    30 day or 90 day supply: 90    Preferred Pharmacy: [facility] [address]    [facility] Appt. Date: 6/5/24    Next Appt. Date: none    Additional Information For Provider:

## 2024-10-14 RX ORDER — AMLODIPINE BESYLATE 5 MG/1
5 TABLET ORAL DAILY
Qty: 30 TABLET | Refills: 5 | Status: SHIPPED | OUTPATIENT
Start: 2024-10-14

## 2024-10-14 RX ORDER — PANTOPRAZOLE SODIUM 40 MG/1
40 TABLET, DELAYED RELEASE ORAL DAILY
Qty: 90 TABLET | Refills: 3 | Status: SHIPPED | OUTPATIENT
Start: 2024-10-14

## 2024-11-08 ENCOUNTER — TELEMEDICINE (OUTPATIENT)
Dept: INTERNAL MEDICINE CLINIC | Facility: CLINIC | Age: 34
End: 2024-11-08
Payer: COMMERCIAL

## 2024-11-08 DIAGNOSIS — E66.812 CLASS 2 SEVERE OBESITY DUE TO EXCESS CALORIES WITH SERIOUS COMORBIDITY AND BODY MASS INDEX (BMI) OF 39.0 TO 39.9 IN ADULT: Primary | ICD-10-CM

## 2024-11-08 DIAGNOSIS — R73.03 PREDIABETES: ICD-10-CM

## 2024-11-08 DIAGNOSIS — E66.01 CLASS 2 SEVERE OBESITY DUE TO EXCESS CALORIES WITH SERIOUS COMORBIDITY AND BODY MASS INDEX (BMI) OF 39.0 TO 39.9 IN ADULT: Primary | ICD-10-CM

## 2024-11-08 PROCEDURE — 99213 OFFICE O/P EST LOW 20 MIN: CPT | Performed by: INTERNAL MEDICINE

## 2024-11-08 ASSESSMENT — ENCOUNTER SYMPTOMS
ABDOMINAL PAIN: 0
VOMITING: 0
NAUSEA: 0
SHORTNESS OF BREATH: 0
DIARRHEA: 0
BLOOD IN STOOL: 0
CHEST TIGHTNESS: 0
RHINORRHEA: 0
SINUS PRESSURE: 0
COUGH: 0

## 2024-11-08 NOTE — ASSESSMENT & PLAN NOTE
Recommended low fat, low cholesterol, low carbohydrate diet and regular exercise.  Patient is at risk for developing diabetes, and will strive to make lifestyle modifications.  Will check HgbA1c at next office visit.      Orders:    Semaglutide-Weight Management (WEGOVY) 0.25 MG/0.5ML SOAJ SC injection; Inject 0.25 mg into the skin every 7 days

## 2024-11-08 NOTE — ASSESSMENT & PLAN NOTE
Recommended low fat, low cholesterol, low carbohydrate diet.  Recommended increasing fresh fruits and vegetables in diet, eating lean meats, like fish and poultry, that are baked, broiled or grilled, not fried.  Recommended regular exercise, 30 minutes of aerobic activity 4-5 days a week.  Recommended monitoring weight closely and keeping follow-up appointments for recheck.  Trial of Wegovy, SE reviewed in detail; will monitor A1c with fasting labs as previously ordered;     Orders:    Semaglutide-Weight Management (WEGOVY) 0.25 MG/0.5ML SOAJ SC injection; Inject 0.25 mg into the skin every 7 days

## 2024-11-08 NOTE — PROGRESS NOTES
Karli Hsu, was evaluated through a synchronous (real-time) audio-video encounter. The patient (or guardian if applicable) is aware that this is a billable service, which includes applicable co-pays. This Virtual Visit was conducted with patient's (and/or legal guardian's) consent. Patient identification was verified, and a caregiver was present when appropriate.   The patient was located at Home: [address] [place] [address] [address] [place] [id number]  Provider was located at Facility (Appt Dept): [address] [place] [address] [address] [place] [phone]  Confirm you are appropriately licensed, registered, or certified to deliver care in the state where the patient is located as indicated above. If you are not or unsure, please re-schedule the visit: Yes, I confirm.     Karli Hsu (:  1990) is a Established patient, presenting virtually for evaluation of the following:      Below is the assessment and plan developed based on review of pertinent history, physical exam, labs, studies, and medications.     Assessment & Plan  Class 2 severe obesity due to excess calories with serious comorbidity and body mass index (BMI) of 39.0 to 39.9 in adult   Recommended low fat, low cholesterol, low carbohydrate diet.  Recommended increasing fresh fruits and vegetables in diet, eating lean meats, like fish and poultry, that are baked, broiled or grilled, not fried.  Recommended regular exercise, 30 minutes of aerobic activity 4-5 days a week.  Recommended monitoring weight closely and keeping follow-up appointments for recheck.  Trial of Wegovy, SE reviewed in detail; will monitor A1c with fasting labs as previously ordered;     Orders:    Semaglutide-Weight Management (WEGOVY) 0.25 MG/0.5ML SOAJ SC injection; Inject 0.25 mg into the skin every 7 days    Prediabetes   Recommended low fat, low cholesterol, low carbohydrate diet and regular exercise.  Patient is at risk for developing diabetes, and

## 2024-12-16 DIAGNOSIS — I10 PRIMARY HYPERTENSION: ICD-10-CM

## 2024-12-16 RX ORDER — AMLODIPINE BESYLATE 5 MG/1
5 TABLET ORAL DAILY
Qty: 30 TABLET | Refills: 5 | Status: SHIPPED | OUTPATIENT
Start: 2024-12-16

## 2025-01-08 DIAGNOSIS — K21.9 GASTROESOPHAGEAL REFLUX DISEASE WITHOUT ESOPHAGITIS: ICD-10-CM

## 2025-01-08 RX ORDER — LANSOPRAZOLE 30 MG/1
CAPSULE, DELAYED RELEASE ORAL
Refills: 0 | OUTPATIENT
Start: 2025-01-08

## 2025-01-10 DIAGNOSIS — K21.9 GASTROESOPHAGEAL REFLUX DISEASE WITHOUT ESOPHAGITIS: ICD-10-CM

## 2025-01-10 RX ORDER — LANSOPRAZOLE 30 MG/1
CAPSULE, DELAYED RELEASE ORAL
Refills: 0 | OUTPATIENT
Start: 2025-01-10

## 2025-01-14 ENCOUNTER — OFFICE VISIT (OUTPATIENT)
Dept: OBGYN CLINIC | Age: 35
End: 2025-01-14
Payer: COMMERCIAL

## 2025-01-14 VITALS
BODY MASS INDEX: 48.73 KG/M2 | HEIGHT: 63 IN | DIASTOLIC BLOOD PRESSURE: 80 MMHG | WEIGHT: 275 LBS | SYSTOLIC BLOOD PRESSURE: 126 MMHG

## 2025-01-14 DIAGNOSIS — N90.89 VULVAL LESION: ICD-10-CM

## 2025-01-14 DIAGNOSIS — E28.2 PCOS (POLYCYSTIC OVARIAN SYNDROME): Primary | ICD-10-CM

## 2025-01-14 DIAGNOSIS — Z30.432 ENCOUNTER FOR IUD REMOVAL: ICD-10-CM

## 2025-01-14 PROCEDURE — 99213 OFFICE O/P EST LOW 20 MIN: CPT | Performed by: NURSE PRACTITIONER

## 2025-01-14 PROCEDURE — 3079F DIAST BP 80-89 MM HG: CPT | Performed by: NURSE PRACTITIONER

## 2025-01-14 PROCEDURE — 58301 REMOVE INTRAUTERINE DEVICE: CPT | Performed by: NURSE PRACTITIONER

## 2025-01-14 PROCEDURE — 3074F SYST BP LT 130 MM HG: CPT | Performed by: NURSE PRACTITIONER

## 2025-01-14 NOTE — ASSESSMENT & PLAN NOTE
four 2mm round lesions noted to right upper labia majora, like skin abrasions  Hx of HSV, only 1 outbreak. Will tx will awaiting culture results  Declines full std panel today

## 2025-01-14 NOTE — PROGRESS NOTES
Chaperone for Intimate Exam     Chaperone was offer accepted as part of the rooming process    Chaperone: Lisa Espinoza    
I have reviewed the patient's visit today including history, exam and assessment by HARDEEP Tracy.  I agree with treatment/plan as above.    Jamir Cooper MD  4:28 PM  01/14/25   
Pt comes in today for bumps in vaginal area. Pt states they appeared after using her electric shaver 2-3 days ago. Pt states the bumps hurt, denies discharge or burning.     LAST PAP:  02/19/2024 negative     LAST MAMMO:  never     LMP:  No LMP recorded. (Menstrual status: IUD).    BIRTH CONTROL:  IUD    TOBACCO USE:  No    FAMILY HISTORY OF:   Breast Cancer:  No   Ovarian Cancer:  No   Uterine Cancer:  No   Colon Cancer:  No    Vitals:    01/14/25 1532   BP: 126/80   Site: Left Upper Arm   Position: Sitting   Weight: 124.7 kg (275 lb)   Height: 1.6 m (5' 3\")        Dorothy Salgado MA  01/14/25  3:40 PM    
protect against STD's - condoms are encouraged. Aches s/s reviewed     Patient denies h/o DVT, stroke, MI, migraines, liver disease or HTN  Has not failed tx with depo and OCPs. Declines nexplanon. Would like to try Mirena IUD. Order form completed today  Pt has not yet been able to keep appt for pelvic US, will do this first and if nl will then schedule Mirena insertion  Pt interested in trying glp-1 agent and will f/u with PCP to discuss. Still having difficulty with weight loss and unable to tolerate metformin     3/11/2024: Mirena inserted today  US nl, uterus, ES, ovaries nl, 2.3 right ov simple cyst noted    1/14/25: pt desires IUD removal, wants to TTC  Long discussion today regarding pregnancy and BMI. We review risks including pre-e, GDM, PTD, miscarriage, materal/fetal death. We also discuss [facility] BMI policy and transfer to [facility] if she meets criteria. Pt understands and desires to continue with removal. Mirena removed today (see procedure note)  She will f/u with PCP discuss changing norvasc to pregnancy safer option: labetalol or procardia            Genitourinary    Vulval lesion      four 2mm round lesions noted to right upper labia majora, like skin abrasions  Hx of HSV, only 1 outbreak. Will tx will awaiting culture results  Declines full std panel today         Relevant Orders    HSV by RAMOS     Other Visit Diagnoses       Encounter for IUD removal                Orders Placed This Encounter   Procedures    HSV by RAMOS       Outpatient Encounter Medications as of 1/14/2025   Medication Sig Dispense Refill    amLODIPine (NORVASC) 5 MG tablet Take 1 tablet by mouth daily 30 tablet 5    pantoprazole (PROTONIX) 40 MG tablet Take 1 tablet by mouth daily 90 tablet 3    triamcinolone (KENALOG) 0.1 % ointment Apply topically 2 times daily 30 g 0    albuterol sulfate HFA (PROVENTIL HFA) 108 (90 Base) MCG/ACT inhaler Inhale 2 puffs into the lungs every 6 hours as needed for Wheezing 18 g 1    MIRENA, 52 MG, IUD

## 2025-01-17 ENCOUNTER — TELEPHONE (OUTPATIENT)
Dept: OBGYN CLINIC | Age: 35
End: 2025-01-17

## 2025-01-17 LAB
HSV1 DNA SPEC QL NAA+PROBE: NEGATIVE
HSV2 DNA SPEC QL NAA+PROBE: POSITIVE
SPECIMEN SOURCE: ABNORMAL

## 2025-01-17 RX ORDER — VALACYCLOVIR HYDROCHLORIDE 500 MG/1
500 TABLET, FILM COATED ORAL 2 TIMES DAILY
Qty: 6 TABLET | Refills: 11 | Status: SHIPPED | OUTPATIENT
Start: 2025-01-17

## 2025-01-17 NOTE — TELEPHONE ENCOUNTER
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    Rx sent for valtrex. [employer] message also sent to patient

## 2025-01-21 ENCOUNTER — TELEPHONE (OUTPATIENT)
Dept: INTERNAL MEDICINE CLINIC | Facility: CLINIC | Age: 35
End: 2025-01-21

## 2025-01-27 DIAGNOSIS — K21.9 GASTROESOPHAGEAL REFLUX DISEASE WITHOUT ESOPHAGITIS: ICD-10-CM

## 2025-01-28 RX ORDER — LANSOPRAZOLE 30 MG/1
CAPSULE, DELAYED RELEASE ORAL
Refills: 0 | OUTPATIENT
Start: 2025-01-28

## 2025-01-29 DIAGNOSIS — K21.9 GASTROESOPHAGEAL REFLUX DISEASE WITHOUT ESOPHAGITIS: ICD-10-CM

## 2025-01-30 RX ORDER — PANTOPRAZOLE SODIUM 40 MG/1
40 TABLET, DELAYED RELEASE ORAL DAILY
Qty: 90 TABLET | Refills: 0 | Status: SHIPPED | OUTPATIENT
Start: 2025-01-30

## 2025-01-30 SDOH — ECONOMIC STABILITY: FOOD INSECURITY: WITHIN THE PAST 12 MONTHS, YOU WORRIED THAT YOUR FOOD WOULD RUN OUT BEFORE YOU GOT MONEY TO BUY MORE.: NEVER TRUE

## 2025-01-30 SDOH — ECONOMIC STABILITY: FOOD INSECURITY: WITHIN THE PAST 12 MONTHS, THE FOOD YOU BOUGHT JUST DIDN'T LAST AND YOU DIDN'T HAVE MONEY TO GET MORE.: NEVER TRUE

## 2025-01-30 SDOH — ECONOMIC STABILITY: TRANSPORTATION INSECURITY
IN THE PAST 12 MONTHS, HAS LACK OF TRANSPORTATION KEPT YOU FROM MEETINGS, WORK, OR FROM GETTING THINGS NEEDED FOR DAILY LIVING?: NO

## 2025-01-30 SDOH — ECONOMIC STABILITY: TRANSPORTATION INSECURITY
IN THE PAST 12 MONTHS, HAS THE LACK OF TRANSPORTATION KEPT YOU FROM MEDICAL APPOINTMENTS OR FROM GETTING MEDICATIONS?: NO

## 2025-01-30 SDOH — ECONOMIC STABILITY: INCOME INSECURITY: IN THE LAST 12 MONTHS, WAS THERE A TIME WHEN YOU WERE NOT ABLE TO PAY THE MORTGAGE OR RENT ON TIME?: NO

## 2025-01-30 ASSESSMENT — PATIENT HEALTH QUESTIONNAIRE - PHQ9
SUM OF ALL RESPONSES TO PHQ QUESTIONS 1-9: 0
SUM OF ALL RESPONSES TO PHQ QUESTIONS 1-9: 0
SUM OF ALL RESPONSES TO PHQ9 QUESTIONS 1 & 2: 0
1. LITTLE INTEREST OR PLEASURE IN DOING THINGS: NOT AT ALL
SUM OF ALL RESPONSES TO PHQ QUESTIONS 1-9: 0
SUM OF ALL RESPONSES TO PHQ9 QUESTIONS 1 & 2: 0
SUM OF ALL RESPONSES TO PHQ QUESTIONS 1-9: 0
2. FEELING DOWN, DEPRESSED OR HOPELESS: NOT AT ALL
2. FEELING DOWN, DEPRESSED OR HOPELESS: NOT AT ALL
1. LITTLE INTEREST OR PLEASURE IN DOING THINGS: NOT AT ALL

## 2025-01-31 ENCOUNTER — TELEMEDICINE (OUTPATIENT)
Dept: INTERNAL MEDICINE CLINIC | Facility: CLINIC | Age: 35
End: 2025-01-31
Payer: COMMERCIAL

## 2025-01-31 DIAGNOSIS — I10 ESSENTIAL HYPERTENSION: Primary | ICD-10-CM

## 2025-01-31 PROCEDURE — 99213 OFFICE O/P EST LOW 20 MIN: CPT | Performed by: PHYSICIAN ASSISTANT

## 2025-01-31 RX ORDER — LABETALOL 100 MG/1
100 TABLET, FILM COATED ORAL 2 TIMES DAILY
Qty: 60 TABLET | Refills: 3 | Status: SHIPPED | OUTPATIENT
Start: 2025-01-31

## 2025-01-31 ASSESSMENT — ENCOUNTER SYMPTOMS
CHEST TIGHTNESS: 0
SHORTNESS OF BREATH: 0
VOMITING: 0
DIARRHEA: 0
BLOOD IN STOOL: 0
ABDOMINAL PAIN: 0
NAUSEA: 0

## 2025-01-31 NOTE — PROGRESS NOTES
2025    TELEHEALTH EVALUATION -- Audio/Visual    HPI:    Karli Hsu (:  1990) has requested an audio/video evaluation for the following concern(s):    Chief Complaint   Patient presents with    Hypertension Outreach     Needs to change BP medication as she is considering pregnancy     States that her OB/GYN recommended switching her amlodipine to another agent as she is trying to conceive. She has been taking amlodipine for about 3-4 years, which has worked well to control her BP. She was not able to check BP today in the office. She denies CP, BARTH/SOB, palpitations, lower extremity edema, dizziness, syncopal episodes, tobacco use, regular ETOH use, hx of MI or CVA.      Lab Results   Component Value Date    WBC 5.8 2024    HGB 12.0 2024    HCT 38.6 2024    MCV 76.4 (L) 2024     2024     Lab Results   Component Value Date     2024    K 4.4 2024     2024    CO2 25 2024    BUN 11 2024    CREATININE 0.80 2024    GLUCOSE 86 2024    CALCIUM 9.6 2024    BILITOT <0.2 2024    ALKPHOS 65 2024    AST 22 2024    ALT 20 2024    LABGLOM >90 2024    GFRAA >60 2022    AGRATIO 1.5 02/10/2022    GLOB 3.1 2024       Lab Results   Component Value Date    TSH 1.560 2024    TSHELE 1.07 2023     Lab Results   Component Value Date    CHOL 194 2024    TRIG 91 2024    HDL 43 2024     (H) 2024    VLDL 18 2024    CHOLHDLRATIO 4.5 2024     Hemoglobin A1C   Date Value Ref Range Status   2024 6.1 (H) 0 - 5.6 % Final     Comment:     Reference Range  Normal       <5.7%  Prediabetes  5.7-6.4%  Diabetes     >6.4%           Review of Systems   Respiratory:  Negative for chest tightness and shortness of breath.    Cardiovascular:  Negative for chest pain, palpitations and leg swelling.   Gastrointestinal:  Negative for abdominal

## 2025-02-01 DIAGNOSIS — K21.9 GASTROESOPHAGEAL REFLUX DISEASE WITHOUT ESOPHAGITIS: ICD-10-CM

## 2025-02-03 DIAGNOSIS — K21.9 GASTROESOPHAGEAL REFLUX DISEASE WITHOUT ESOPHAGITIS: ICD-10-CM

## 2025-02-03 RX ORDER — LANSOPRAZOLE 30 MG/1
CAPSULE, DELAYED RELEASE ORAL
Refills: 0 | OUTPATIENT
Start: 2025-02-03

## 2025-02-03 NOTE — TELEPHONE ENCOUNTER
----- Message from Zenia Hsieh PA-C sent at 1/31/2025 12:04 PM EST -----  Please check on status of PA for protonix as patient has been unable to get it. She has failed omeprazole, lansoprazole, and zantac, which do not control reflux

## 2025-02-05 ENCOUNTER — TELEPHONE (OUTPATIENT)
Dept: INTERNAL MEDICINE CLINIC | Facility: CLINIC | Age: 35
End: 2025-02-05

## 2025-02-06 ENCOUNTER — TELEPHONE (OUTPATIENT)
Dept: INTERNAL MEDICINE CLINIC | Facility: CLINIC | Age: 35
End: 2025-02-06

## 2025-02-06 NOTE — TELEPHONE ENCOUNTER
I called the pt at 1040 to ask her to send in a picture of her insurance card to my email at [email or web address]  or upload an image to [employer]. Pt voiced understanding and states she would send it to my work email

## 2025-02-06 NOTE — TELEPHONE ENCOUNTER
I called pt at 1027 to ask if she could send a picture of her insurance card to my email so I can get her PA done for her.

## 2025-02-24 NOTE — PROGRESS NOTES
I have reviewed the patient's visit today including history, exam and assessment by HARDEEP Zaragoza. I agree with treatment/plan as above.     Yahaira Avendaño MD  10:54 AM  08/31/22 Subjective   Marion Anderson is a 72 y.o. female.     Chief Complaint   Patient presents with    Pain        History of Present Illness   She is here today for follow-up for high risk medication use.  She was previously prescribed tramadol 50 mg daily by her previous PCP as needed for chronic pain in the right ankle s/p traumatic ankle fracture and chronic bilateral knee pain, s/p meniscal tear in the right knee.  She notes chronic pain in the right ankle, worse with flexion, extension and prolonged walking.  She also notes chronic bilateral knee pain.  She has a history of osteoarthritis in the knees.  She uses tramadol 50 mg occasionally for more severe pain, typically when the weather is cold and rainy, or if she has been more active and pain has flared.  She notes improvement in more severe pain with the tramadol.  She typically will use the tramadol 5-6 times a month with improvement in pain. She will try to use tylenol arthritis and is in physical therapy with some improvement.     The following portions of the patient's history were reviewed and updated as appropriate: allergies, current medications, past family history, past medical history, past social history, past surgical history, and problem list.    Review of Systems   Constitutional: Negative.    Respiratory: Negative.     Cardiovascular: Negative.    Musculoskeletal:  Positive for arthralgias. Negative for joint swelling.   Neurological:  Negative for weakness and numbness.       Objective   Physical Exam  Constitutional:       Appearance: She is well-developed.   Neck:      Thyroid: No thyroid mass, thyromegaly or thyroid tenderness.      Vascular: No carotid bruit.      Trachea: Trachea normal.   Cardiovascular:      Rate and Rhythm: Normal rate and regular rhythm.      Chest Wall: PMI is not displaced.      Pulses:           Radial pulses are 2+ on the right side and 2+ on the left side.        Dorsalis pedis pulses are 2+ on the right side  and 2+ on the left side.        Posterior tibial pulses are 2+ on the right side and 2+ on the left side.      Heart sounds: S1 normal and S2 normal.   Pulmonary:      Effort: Pulmonary effort is normal.      Breath sounds: Normal breath sounds.   Musculoskeletal:      Right knee: Crepitus present. No swelling or bony tenderness. Normal range of motion. Tenderness present over the lateral joint line. Normal alignment, normal meniscus and normal patellar mobility. Normal pulse.      Left knee: Crepitus present. No swelling or bony tenderness. Normal range of motion. Tenderness present over the lateral joint line. Normal alignment, normal meniscus and normal patellar mobility. Normal pulse.      Right lower leg: No edema.      Left lower leg: No edema.      Right ankle: No swelling, deformity, ecchymosis or lacerations. Tenderness present over the lateral malleolus. Decreased range of motion. Anterior drawer test negative. Normal pulse.      Right Achilles Tendon: Normal.   Lymphadenopathy:      Head:      Right side of head: No submental, submandibular, tonsillar or occipital adenopathy.      Left side of head: No submental, submandibular, tonsillar or occipital adenopathy.      Cervical: No cervical adenopathy.   Skin:     General: Skin is warm and dry.      Capillary Refill: Capillary refill takes less than 2 seconds.      Nails: There is no clubbing.   Neurological:      Mental Status: She is alert and oriented to person, place, and time.   Psychiatric:         Attention and Perception: Attention normal.         Mood and Affect: Mood and affect normal.         Speech: Speech normal.         Behavior: Behavior normal.         Thought Content: Thought content normal.         Cognition and Memory: Cognition normal.         Vitals:    02/24/25 1123   BP: 130/80   Pulse: 67   Temp: 98.2 °F (36.8 °C)   SpO2: 96%      Body mass index is 37.34 kg/m².    Assessment & Plan   Problems Addressed this Visit       Chronic pain  of right ankle - Primary    Osteoarthritis of knees, bilateral     Other Visit Diagnoses       High risk medication use        Relevant Orders    [id number] 8+Oxycodone+Crt-Unbund - Urine, Clean Catch          Diagnoses         Codes Comments    Chronic pain of right ankle    -  Primary ICD-10-CM: M25.571, G89.29  ICD-9-CM: 719.47, 338.29     Primary osteoarthritis of both knees     ICD-10-CM: M17.0  ICD-9-CM: 715.16     High risk medication use     ICD-10-CM: Z79.899  ICD-9-CM: V58.69           1.  Chronic pain of right ankle/osteoarthritis of both knees-will take over as needed prescription for tramadol 50 mg daily as needed.  CSA and PDMP reviewed by me today.  Patient could not leave UDS so will complete this at follow-up.  She is aware of appropriate use and adverse effects of this high risk medication.  Encouraged her to continue to limit use of the tramadol with recommendations to use Tylenol arthritis and continue physical therapy.  Discussed safety measures and fall risk reduction techniques.  Follow-up for high risk medication use in 3 months.

## 2025-02-25 ENCOUNTER — HOSPITAL ENCOUNTER (EMERGENCY)
Age: 35
Discharge: HOME | End: 2025-02-25
Payer: COMMERCIAL

## 2025-02-25 VITALS
OXYGEN SATURATION: 98 % | HEART RATE: 88 BPM | DIASTOLIC BLOOD PRESSURE: 88 MMHG | BODY MASS INDEX: 48.55 KG/M2 | HEIGHT: 63 IN | WEIGHT: 274 LBS | SYSTOLIC BLOOD PRESSURE: 140 MMHG | TEMPERATURE: 98.5 F | RESPIRATION RATE: 18 BRPM

## 2025-02-25 DIAGNOSIS — H66.92 LEFT OTITIS MEDIA, UNSPECIFIED OTITIS MEDIA TYPE: ICD-10-CM

## 2025-02-25 DIAGNOSIS — J02.0 STREPTOCOCCAL SORE THROAT: Primary | ICD-10-CM

## 2025-02-25 LAB
FLUAV RNA SPEC QL NAA+PROBE: NOT DETECTED
FLUBV RNA SPEC QL NAA+PROBE: NOT DETECTED
SARS-COV-2 RDRP RESP QL NAA+PROBE: NOT DETECTED
SOURCE: NORMAL
STREP, MOLECULAR: DETECTED

## 2025-02-25 PROCEDURE — 87651 STREP A DNA AMP PROBE: CPT

## 2025-02-25 PROCEDURE — 99283 EMERGENCY DEPT VISIT LOW MDM: CPT

## 2025-02-25 PROCEDURE — 87502 INFLUENZA DNA AMP PROBE: CPT

## 2025-02-25 PROCEDURE — 87635 SARS-COV-2 COVID-19 AMP PRB: CPT

## 2025-02-25 RX ORDER — FLUTICASONE PROPIONATE 50 MCG
2 SPRAY, SUSPENSION (ML) NASAL DAILY
Qty: 16 G | Refills: 0 | Status: SHIPPED | OUTPATIENT
Start: 2025-02-25

## 2025-02-25 RX ORDER — AZITHROMYCIN 250 MG/1
TABLET, FILM COATED ORAL
Qty: 6 TABLET | Refills: 0 | Status: SHIPPED | OUTPATIENT
Start: 2025-02-25 | End: 2025-03-07

## 2025-02-25 ASSESSMENT — PAIN SCALES - GENERAL
PAINLEVEL_OUTOF10: 8
PAINLEVEL_OUTOF10: 8

## 2025-02-25 ASSESSMENT — PAIN DESCRIPTION - LOCATION
LOCATION: THROAT
LOCATION: THROAT

## 2025-02-25 ASSESSMENT — PAIN DESCRIPTION - DESCRIPTORS
DESCRIPTORS: SORE
DESCRIPTORS: SORE

## 2025-02-25 ASSESSMENT — PAIN - FUNCTIONAL ASSESSMENT
PAIN_FUNCTIONAL_ASSESSMENT: 0-10
PAIN_FUNCTIONAL_ASSESSMENT: 0-10

## 2025-02-25 ASSESSMENT — PAIN DESCRIPTION - PAIN TYPE
TYPE: ACUTE PAIN
TYPE: ACUTE PAIN

## 2025-02-25 NOTE — DISCHARGE INSTRUCTIONS
Drink at least 8 cups of water a day. Take Tylenol as needed for pain/fever. Salt water gargles after each meal and before bed. Lemon and Honey mixture for sore throat.    Take medication as prescribed. Use Flonase and Normal Saline nasal spray.     Call, make an appointment and follow up with your doctor in 1-2 days for a recheck.    Return to ER for worsening symptoms, chest pain, shortness of breath or any other concerns.

## 2025-02-25 NOTE — ED PROVIDER NOTES
(3/14/2024)    PRAPARE - Transportation     Lack of Transportation (Non-Medical): No   Social Connections: Unknown (6/30/2024)    Received from [facility]    Social Connections     Frequency of Communication with Friends and Family: Not asked     Frequency of Social Gatherings with Friends and Family: Not asked   Intimate Partner Violence: Unknown (6/30/2024)    Received from "[employer] [facility]    Intimate Partner Violence     Fear of Current or Ex-Partner: Not asked     Emotionally Abused: Not asked     Physically Abused: Not asked     Sexually Abused: Not asked   Housing Stability: Unknown (1/30/2025)    Housing Stability Vital Sign     Number of Times Moved in the Last Year: 0     Homeless in the Last Year: No        Discharge Medication List as of 2/25/2025 11:16 AM        CONTINUE these medications which have NOT CHANGED    Details   labetalol (NORMODYNE) 100 MG tablet Take 1 tablet by mouth 2 times daily, Disp-60 tablet, R-3Normal      pantoprazole (PROTONIX) 40 MG tablet Take 1 tablet by mouth daily, Disp-90 tablet, R-0Normal      valACYclovir (VALTREX) 500 MG tablet Take 1 tablet by mouth 2 times daily, Disp-6 tablet, R-11Normal      albuterol sulfate HFA (PROVENTIL HFA) 108 (90 Base) MCG/ACT inhaler Inhale 2 puffs into the lungs every 6 hours as needed for Wheezing, Disp-18 g, R-1Print      Multiple Vitamins-Minerals (THERAPEUTIC MULTIVITAMIN-MINERALS) tablet Take 1 tablet by mouth dailyHistorical Med              Results for orders placed or performed during the hospital encounter of 02/25/25   Influenza A/B, Molecular    Specimen: Nasopharyngeal   Result Value Ref Range    Influenza A, RAMOS Not detected NOTD      Influenza B, RAMOS Not detected NOTD     COVID-19, Rapid    Specimen: Nasopharyngeal   Result Value Ref Range    Source NOT SPECIFIED      SARS-CoV-2, Rapid Not detected NOTD     Group A Strep Screen By PCR    Specimen: Throat   Result Value Ref Range    Strep, Molecular

## 2025-03-04 ENCOUNTER — OFFICE VISIT (OUTPATIENT)
Dept: INTERNAL MEDICINE CLINIC | Facility: CLINIC | Age: 35
End: 2025-03-04
Payer: COMMERCIAL

## 2025-03-04 VITALS
DIASTOLIC BLOOD PRESSURE: 80 MMHG | OXYGEN SATURATION: 100 % | SYSTOLIC BLOOD PRESSURE: 130 MMHG | HEIGHT: 63 IN | BODY MASS INDEX: 49.12 KG/M2 | HEART RATE: 89 BPM | WEIGHT: 277.2 LBS

## 2025-03-04 DIAGNOSIS — I10 ESSENTIAL HYPERTENSION: Primary | ICD-10-CM

## 2025-03-04 PROCEDURE — 3075F SYST BP GE 130 - 139MM HG: CPT | Performed by: NURSE PRACTITIONER

## 2025-03-04 PROCEDURE — 3079F DIAST BP 80-89 MM HG: CPT | Performed by: NURSE PRACTITIONER

## 2025-03-04 PROCEDURE — 99213 OFFICE O/P EST LOW 20 MIN: CPT | Performed by: NURSE PRACTITIONER

## 2025-03-04 ASSESSMENT — ENCOUNTER SYMPTOMS
ABDOMINAL PAIN: 0
COUGH: 0
VOMITING: 0
SHORTNESS OF BREATH: 0
NAUSEA: 0

## 2025-03-04 NOTE — PROGRESS NOTES
[facility]      3/4/2025    Patient Name: Karli Hsu  :  1990      Chief Complaint:  Chief Complaint   Patient presents with    Other     Follow up on blood pressure         HPI  Patient presents today for follow-up on hypertension. Seen virtually on 25 requesting to switch her antihypertensive medication at the recommendation of her OB/GYN as patient is trying to conceive. Had previously been on amlodipine for 3-4 years which controlled her BP well. When seen on 25, she was switched to labetalol 100 mg BID. Presents today for follow-up. Admits that she has only been taking the medication once daily. She has not been checking BP at home. Denies any unfavorable side effects.         Past Medical History:   Diagnosis Date    GERD (gastroesophageal reflux disease)     Hypertension     PCOS (polycystic ovarian syndrome) 2022    Prediabetes 2022    Sickle cell trait 2022       Past Surgical History:   Procedure Laterality Date     SECTION  10/2020    DENTAL SURGERY         Family History   Problem Relation Age of Onset    Arthritis Mother     Other Father         Vertigo    Uterine Cancer Neg Hx     Ovarian Cancer Neg Hx     Colon Cancer Neg Hx     Breast Cancer Neg Hx        Social History     Tobacco Use    Smoking status: Former     Current packs/day: 0.00     Average packs/day: 0.3 packs/day for 10.1 years (2.5 ttl pk-yrs)     Types: Cigarettes     Start date: 2008     Quit date: 2018     Years since quittin.7    Smokeless tobacco: Never   Vaping Use    Vaping status: Never Used   Substance Use Topics    Alcohol use: Not Currently    Drug use: Never       Current Outpatient Medications:     fluticasone (FLONASE) 50 MCG/ACT nasal spray, 2 sprays by Each Nostril route daily, Disp: 16 g, Rfl: 0    labetalol (NORMODYNE) 100 MG tablet, Take 1 tablet by mouth 2 times daily, Disp: 60 tablet, Rfl: 3    pantoprazole (PROTONIX) 40 MG tablet, Take 1

## 2025-04-29 ENCOUNTER — OFFICE VISIT (OUTPATIENT)
Dept: INTERNAL MEDICINE CLINIC | Facility: CLINIC | Age: 35
End: 2025-04-29
Payer: COMMERCIAL

## 2025-04-29 VITALS
DIASTOLIC BLOOD PRESSURE: 76 MMHG | TEMPERATURE: 98.4 F | HEIGHT: 63 IN | HEART RATE: 93 BPM | WEIGHT: 282 LBS | OXYGEN SATURATION: 96 % | BODY MASS INDEX: 49.96 KG/M2 | SYSTOLIC BLOOD PRESSURE: 128 MMHG

## 2025-04-29 DIAGNOSIS — E66.813 CLASS 3 SEVERE OBESITY WITH BODY MASS INDEX (BMI) OF 45.0 TO 49.9 IN ADULT, UNSPECIFIED OBESITY TYPE, UNSPECIFIED WHETHER SERIOUS COMORBIDITY PRESENT (HCC): ICD-10-CM

## 2025-04-29 DIAGNOSIS — G89.29 CHRONIC BILATERAL LOW BACK PAIN WITH RIGHT-SIDED SCIATICA: Primary | ICD-10-CM

## 2025-04-29 DIAGNOSIS — M54.41 CHRONIC BILATERAL LOW BACK PAIN WITH RIGHT-SIDED SCIATICA: Primary | ICD-10-CM

## 2025-04-29 PROCEDURE — 3078F DIAST BP <80 MM HG: CPT | Performed by: NURSE PRACTITIONER

## 2025-04-29 PROCEDURE — 3074F SYST BP LT 130 MM HG: CPT | Performed by: NURSE PRACTITIONER

## 2025-04-29 PROCEDURE — 99214 OFFICE O/P EST MOD 30 MIN: CPT | Performed by: NURSE PRACTITIONER

## 2025-04-29 RX ORDER — METHYLPREDNISOLONE 4 MG/1
TABLET ORAL
Qty: 21 TABLET | Refills: 0 | Status: SHIPPED | OUTPATIENT
Start: 2025-04-29 | End: 2025-05-05

## 2025-04-29 ASSESSMENT — ENCOUNTER SYMPTOMS
ABDOMINAL PAIN: 0
VOMITING: 0
COUGH: 0
NAUSEA: 0
SHORTNESS OF BREATH: 0
BACK PAIN: 1

## 2025-04-29 NOTE — PROGRESS NOTES
[facility]      2025    Patient Name: Karli Hsu  :  1990      Chief Complaint:  Chief Complaint   Patient presents with    Back Pain     Lower back, month         HPI  Patient presents today for follow-up on her chronic low back pain. Has been present her \"whole life\" but worsened in  after her child was born. She reports a nearly constant dull ache across her low back (worse on right side) with occasional sharp pain radiating into the right buttock and down the posterior right thigh. She has had rare episodes of right leg numbness but none recently. She has tried ibuprofen and OTC medicated patches but denies much improvement. Standing for long periods aggravates the pain. Feels that her back pain would be better if she could lose some weight. Has been on metformin in the past (also has PCOS) but stopped due to GI side effects. PCP tried prescribing Wegovy, but insurance denied.         Past Medical History:   Diagnosis Date    GERD (gastroesophageal reflux disease)     Hypertension     PCOS (polycystic ovarian syndrome) 2022    Prediabetes 2022    Sickle cell trait 2022       Past Surgical History:   Procedure Laterality Date     SECTION  10/2020    DENTAL SURGERY         Family History   Problem Relation Age of Onset    Arthritis Mother     Other Father         Vertigo    Uterine Cancer Neg Hx     Ovarian Cancer Neg Hx     Colon Cancer Neg Hx     Breast Cancer Neg Hx        Social History     Tobacco Use    Smoking status: Former     Current packs/day: 0.00     Average packs/day: 0.3 packs/day for 10.1 years (2.5 ttl pk-yrs)     Types: Cigarettes     Start date: 2008     Quit date: 2018     Years since quittin.8    Smokeless tobacco: Never   Vaping Use    Vaping status: Never Used   Substance Use Topics    Alcohol use: Not Currently    Drug use: Never       Current Outpatient Medications:     methylPREDNISolone (MEDROL DOSEPACK) 4 MG

## 2025-04-30 DIAGNOSIS — K21.9 GASTROESOPHAGEAL REFLUX DISEASE WITHOUT ESOPHAGITIS: ICD-10-CM

## 2025-04-30 RX ORDER — PANTOPRAZOLE SODIUM 40 MG/1
40 TABLET, DELAYED RELEASE ORAL DAILY
Qty: 90 TABLET | Refills: 0 | Status: SHIPPED | OUTPATIENT
Start: 2025-04-30

## 2025-05-05 ENCOUNTER — APPOINTMENT (OUTPATIENT)
Dept: GENERAL RADIOLOGY | Age: 35
End: 2025-05-05
Payer: COMMERCIAL

## 2025-05-05 ENCOUNTER — HOSPITAL ENCOUNTER (EMERGENCY)
Age: 35
Discharge: HOME | End: 2025-05-05
Payer: COMMERCIAL

## 2025-05-05 VITALS
BODY MASS INDEX: 48.14 KG/M2 | WEIGHT: 282 LBS | SYSTOLIC BLOOD PRESSURE: 165 MMHG | DIASTOLIC BLOOD PRESSURE: 75 MMHG | TEMPERATURE: 98.2 F | HEART RATE: 94 BPM | OXYGEN SATURATION: 97 % | HEIGHT: 64 IN | RESPIRATION RATE: 16 BRPM

## 2025-05-05 DIAGNOSIS — G56.03 BILATERAL CARPAL TUNNEL SYNDROME: Primary | ICD-10-CM

## 2025-05-05 DIAGNOSIS — M54.50 ACUTE RIGHT-SIDED LOW BACK PAIN WITHOUT SCIATICA: ICD-10-CM

## 2025-05-05 PROCEDURE — 99284 EMERGENCY DEPT VISIT MOD MDM: CPT

## 2025-05-05 PROCEDURE — 96372 THER/PROPH/DIAG INJ SC/IM: CPT

## 2025-05-05 PROCEDURE — 72100 X-RAY EXAM L-S SPINE 2/3 VWS: CPT

## 2025-05-05 PROCEDURE — 73110 X-RAY EXAM OF WRIST: CPT

## 2025-05-05 PROCEDURE — 72040 X-RAY EXAM NECK SPINE 2-3 VW: CPT

## 2025-05-05 PROCEDURE — 6360000002 HC RX W HCPCS: Performed by: PHYSICIAN ASSISTANT

## 2025-05-05 RX ORDER — METHOCARBAMOL 500 MG/1
500 TABLET, FILM COATED ORAL 3 TIMES DAILY
Qty: 30 TABLET | Refills: 0 | Status: SHIPPED | OUTPATIENT
Start: 2025-05-05 | End: 2025-05-15

## 2025-05-05 RX ORDER — KETOROLAC TROMETHAMINE 30 MG/ML
30 INJECTION, SOLUTION INTRAMUSCULAR; INTRAVENOUS ONCE
Status: COMPLETED | OUTPATIENT
Start: 2025-05-05 | End: 2025-05-05

## 2025-05-05 RX ORDER — PREDNISONE 50 MG/1
50 TABLET ORAL DAILY
Qty: 5 TABLET | Refills: 0 | Status: SHIPPED | OUTPATIENT
Start: 2025-05-05 | End: 2025-05-10

## 2025-05-05 RX ADMIN — KETOROLAC TROMETHAMINE 30 MG: 30 INJECTION, SOLUTION INTRAMUSCULAR at 17:43

## 2025-05-05 ASSESSMENT — PAIN DESCRIPTION - LOCATION: LOCATION: BACK

## 2025-05-05 ASSESSMENT — ENCOUNTER SYMPTOMS
GASTROINTESTINAL NEGATIVE: 1
RESPIRATORY NEGATIVE: 1
BACK PAIN: 1

## 2025-05-05 ASSESSMENT — PAIN - FUNCTIONAL ASSESSMENT: PAIN_FUNCTIONAL_ASSESSMENT: 0-10

## 2025-05-05 ASSESSMENT — PAIN DESCRIPTION - PAIN TYPE: TYPE: ACUTE PAIN

## 2025-05-05 ASSESSMENT — PAIN SCALES - GENERAL: PAINLEVEL_OUTOF10: 8

## 2025-05-05 ASSESSMENT — PAIN DESCRIPTION - ORIENTATION: ORIENTATION: LOWER

## 2025-05-05 NOTE — ED PROVIDER NOTES
Emergency Department Provider Note       [facility] EMERGENCY DEPT   PCP: Charity Bravo MD   Age: 35 y.o.   Sex: female     DISPOSITION Decision To Discharge 05/05/2025 07:41:34 PM    ICD-10-CM    1. Bilateral carpal tunnel syndrome  G56.03 [facility]      2. Acute right-sided low back pain without sciatica  M54.50           Medical Decision Making     35-year-old female presents with bilateral hand tingling that has been a problem off and on, but seems worse the past few days.  Worse when she sits at her computer when her hands a certain way.  Better if she shakes them out.  Also was right lower back pain is consistent with prior bouts of low back pain.  No saddle anesthesia or bowel or bladder incontinence.  She has had this problem for a while.  Neurovascularly intact.  No fevers or IV drug abuse.  Also had some soreness on the right side of her posterior neck after sleeping funny last night.  Imaging all negative for acute abnormality.  She has positive Tinel and Phalen sign over bilateral wrist concerning for possible carpal tunnel syndrome causing her tingling in her hands.  Will place in bilateral braces.  Will give steroids and muscle relaxers.  Will refer her to Ortho.  She is to return if worsening in any way.     1 or more acute illnesses that pose a threat to life or bodily function.   Prescription drug management performed.  Patient was discharged risks and benefits of hospitalization were considered.  Shared medical decision making was utilized in creating the patients health plan today.  I independently ordered and reviewed each unique test.                         History     35-year-old female presents with bilateral hand tingling that started several days ago.  She has had this problem if she sleeps on her arm and it falls asleep, but she has had this more work with her hands in certain positions or at home with her hands in certain positions.  When she shakes her arms

## 2025-05-05 NOTE — ED TRIAGE NOTES
Patient to triage with c/o bilateral hand numbness x 1 week, along with lower right/hip pain x a few months but has recently gotten worse.

## 2025-05-16 ENCOUNTER — OFFICE VISIT (OUTPATIENT)
Dept: INTERNAL MEDICINE CLINIC | Facility: CLINIC | Age: 35
End: 2025-05-16

## 2025-05-16 VITALS
OXYGEN SATURATION: 98 % | WEIGHT: 285.2 LBS | BODY MASS INDEX: 48.69 KG/M2 | HEART RATE: 97 BPM | SYSTOLIC BLOOD PRESSURE: 138 MMHG | HEIGHT: 64 IN | DIASTOLIC BLOOD PRESSURE: 100 MMHG | TEMPERATURE: 98.1 F

## 2025-05-16 DIAGNOSIS — E28.2 PCOS (POLYCYSTIC OVARIAN SYNDROME): ICD-10-CM

## 2025-05-16 DIAGNOSIS — I10 ESSENTIAL HYPERTENSION: ICD-10-CM

## 2025-05-16 DIAGNOSIS — I10 PRIMARY HYPERTENSION: ICD-10-CM

## 2025-05-16 DIAGNOSIS — K21.9 GASTROESOPHAGEAL REFLUX DISEASE WITHOUT ESOPHAGITIS: ICD-10-CM

## 2025-05-16 DIAGNOSIS — N91.2 AMENORRHEA: Primary | ICD-10-CM

## 2025-05-16 RX ORDER — PANTOPRAZOLE SODIUM 40 MG/1
40 TABLET, DELAYED RELEASE ORAL DAILY
Qty: 30 TABLET | Refills: 5 | Status: SHIPPED | OUTPATIENT
Start: 2025-08-16

## 2025-05-16 RX ORDER — PANTOPRAZOLE SODIUM 40 MG/1
40 TABLET, DELAYED RELEASE ORAL DAILY
Qty: 90 TABLET | Refills: 3 | Status: SHIPPED | OUTPATIENT
Start: 2025-08-16 | End: 2025-05-16 | Stop reason: SDUPTHER

## 2025-05-16 RX ORDER — LABETALOL 100 MG/1
100 TABLET, FILM COATED ORAL 2 TIMES DAILY
Qty: 180 TABLET | Refills: 3 | Status: SHIPPED | OUTPATIENT
Start: 2025-05-16

## 2025-05-16 RX ORDER — MEDROXYPROGESTERONE ACETATE 10 MG
10 TABLET ORAL DAILY
Qty: 10 TABLET | Refills: 0 | Status: SHIPPED | OUTPATIENT
Start: 2025-05-16 | End: 2025-05-26

## 2025-05-16 ASSESSMENT — ENCOUNTER SYMPTOMS
RHINORRHEA: 0
VOMITING: 0
CHEST TIGHTNESS: 0
COUGH: 0
DIARRHEA: 0
SINUS PRESSURE: 0
ABDOMINAL PAIN: 0
BLOOD IN STOOL: 0
NAUSEA: 0
SHORTNESS OF BREATH: 0

## 2025-05-16 NOTE — PROGRESS NOTES
[facility]      2025    Patient Name: Karli Hsu  :  1990    Subjective:    Chief Complaint:  Chief Complaint   Patient presents with    Menstrual Problem     IUD removed 25. No period since 2nd week in February.          HPI Here for f/u; she has hx of PCOS, saw JOSÉ MIGUEL Oropeza in January; she had Mirena IUD inserted 3/11/24 and requested that it be removed at that time; Mirena IUD removed; records reviewed; she has not had menses since end ; she's had negative pregnancy tests at home several times; she is  and sexually active and would like to have another child;   HTN:  Patient compliant with taking blood pressure medications: Yes, but has been taking Labetalol 100 mg qd instead of bid due to misunderstanding;   Discussed importance of following low sodium DASH diet, increasing physical activity, taking medications as ordered, decreasing alcohol intake, keeping f/u visits to recheck blood pressure, monitoring blood pressure at home and keeping a log, with goal blood pressure <140/90.  Home bp readings are: fluctuating      Past Medical History:   Diagnosis Date    GERD (gastroesophageal reflux disease)     Hypertension     PCOS (polycystic ovarian syndrome) 2022    Prediabetes 2022    Sickle cell trait 2022       Past Surgical History:   Procedure Laterality Date     SECTION  10/2020    DENTAL SURGERY         Family History   Problem Relation Age of Onset    Arthritis Mother     Other Father         Vertigo    Uterine Cancer Neg Hx     Ovarian Cancer Neg Hx     Colon Cancer Neg Hx     Breast Cancer Neg Hx        Social History     Tobacco Use    Smoking status: Former     Current packs/day: 0.00     Average packs/day: 0.3 packs/day for 10.1 years (2.5 ttl pk-yrs)     Types: Cigarettes     Start date: 2008     Quit date: 2018     Years since quittin.9    Smokeless tobacco: Never   Vaping Use    Vaping status: Never Used